# Patient Record
Sex: FEMALE | Race: WHITE | Employment: FULL TIME
[De-identification: names, ages, dates, MRNs, and addresses within clinical notes are randomized per-mention and may not be internally consistent; named-entity substitution may affect disease eponyms.]

---

## 2018-09-02 ENCOUNTER — HEALTH MAINTENANCE LETTER (OUTPATIENT)
Age: 28
End: 2018-09-02

## 2019-11-03 ENCOUNTER — HEALTH MAINTENANCE LETTER (OUTPATIENT)
Age: 29
End: 2019-11-03

## 2020-04-20 LAB
ABO + RH BLD: NORMAL
ABO + RH BLD: NORMAL
BLD GP AB SCN SERPL QL: NEGATIVE
C TRACH DNA SPEC QL PROBE+SIG AMP: NEGATIVE
CULTURE MICRO: NEGATIVE
HBV SURFACE AG SERPL QL IA: NEGATIVE
HEMOGLOBIN: 12.9 G/DL (ref 11.7–15.7)
N GONORRHOEA DNA SPEC QL PROBE+SIG AMP: NEGATIVE
PLATELET # BLD AUTO: 307 10^9/L
RUBELLA ANTIBODY IGG QUANTITATIVE: NORMAL IU/ML

## 2020-04-24 ENCOUNTER — OFFICE VISIT (OUTPATIENT)
Dept: OBGYN | Facility: CLINIC | Age: 30
End: 2020-04-24
Attending: MIDWIFE
Payer: COMMERCIAL

## 2020-04-24 DIAGNOSIS — O09.899 SUPERVISION OF OTHER HIGH RISK PREGNANCY, ANTEPARTUM: Primary | ICD-10-CM

## 2020-04-24 NOTE — LETTER
Date:April 27, 2020      Patient was self referred, no letter generated. Do not send.        [facility] Health Information

## 2020-04-24 NOTE — LETTER
2020       RE: Leticia Ngo  [address]  [place] [address]     Dear Colleague,    Thank you for referring your patient, Leticia Ngo, to the [facility] at [facility]. Please see a copy of my visit note below.    Leticia is a 31 yo  at 11 wks   calling just to speak with midwife team considering TANYA  Has not decided  Not doing official intake visit.  Hx 14 mos old SVB with brief shoulder dystocia  apgars 8,9 no injury   Resolved with flipping from knees to and ? Post arm reduction  Pt is unclear.  Pt was following [facility] midwife team and [facility] due to PROM without labor at term  . Pt has had early visit with dating 7 wk US and NOB labs at [facility]  Had a visit last Monday and heard FHT  Pt is declining genetic testing    Reviewed recommendation for early GCT and A1C   Reviewed [facility], team OB/CNM  And [facility]  Pt will schedule a visit in 4-5 wks for TANYA appt with records.    Opal Covington CNM APRCODEY    Again, thank you for allowing me to participate in the care of your patient.      Sincerely,    EVETTE Dinero CNM

## 2020-04-24 NOTE — PROGRESS NOTES
Leticia is a 31 yo  at 11 wks   calling just to speak with midwife team considering TANYA  Has not decided  Not doing official intake visit.  Hx 14 mos old SVB with brief shoulder dystocia  apgars 8,9 no injury   Resolved with flipping from knees to and ? Post arm reduction  Pt is unclear.  Pt was following [facility] midwife team and was [facility] due to PROM without labor at term  . Pt has had early visit with dating 7 wk US and NOB labs at [facility]  Had a visit last Monday and heard FHT  Pt is declining genetic testing    Reviewed recommendation for early GCT and A1C   Reviewed [facility], team OB/CNM  And [facility]  Pt will schedule a visit in 4-5 wks for TANYA appt with records.    Opal Covington CNM APRN

## 2020-05-05 ENCOUNTER — PRENATAL OFFICE VISIT (OUTPATIENT)
Dept: NURSING | Facility: CLINIC | Age: 30
End: 2020-05-05
Payer: COMMERCIAL

## 2020-05-05 VITALS — BODY MASS INDEX: 22.58 KG/M2 | HEIGHT: 65 IN

## 2020-05-05 DIAGNOSIS — Z34.90 SUPERVISION OF NORMAL PREGNANCY: Primary | ICD-10-CM

## 2020-05-05 PROBLEM — K21.9 ACID REFLUX: Status: ACTIVE | Noted: 2018-11-30

## 2020-05-05 PROCEDURE — 99207 ZZC NO CHARGE NURSE ONLY: CPT

## 2020-05-05 SDOH — SOCIAL STABILITY: SOCIAL INSECURITY: WITHIN THE LAST YEAR, HAVE YOU BEEN HUMILIATED OR EMOTIONALLY ABUSED IN OTHER WAYS BY YOUR PARTNER OR EX-PARTNER?: NO

## 2020-05-05 SDOH — HEALTH STABILITY: MENTAL HEALTH
STRESS IS WHEN SOMEONE FEELS TENSE, NERVOUS, ANXIOUS, OR CAN'T SLEEP AT NIGHT BECAUSE THEIR MIND IS TROUBLED. HOW STRESSED ARE YOU?: NOT AT ALL

## 2020-05-05 SDOH — SOCIAL STABILITY: SOCIAL INSECURITY
WITHIN THE LAST YEAR, HAVE YOU BEEN KICKED, HIT, SLAPPED, OR OTHERWISE PHYSICALLY HURT BY YOUR PARTNER OR EX-PARTNER?: NO

## 2020-05-05 SDOH — SOCIAL STABILITY: SOCIAL INSECURITY: WITHIN THE LAST YEAR, HAVE YOU BEEN AFRAID OF YOUR PARTNER OR EX-PARTNER?: NO

## 2020-05-05 SDOH — SOCIAL STABILITY: SOCIAL INSECURITY
WITHIN THE LAST YEAR, HAVE TO BEEN RAPED OR FORCED TO HAVE ANY KIND OF SEXUAL ACTIVITY BY YOUR PARTNER OR EX-PARTNER?: NO

## 2020-05-05 NOTE — PROGRESS NOTES
Important Information for Provider:     Patient is a patient at [facility] with the CNM group, she had TANYA last pregnancy. Patient is coming in for meeting our CNM group and has appointment with Christina 5/07/2020. Patient has not decided on a CNM group. New ob intake/ NOB labs were done at [facility]. Updated history.      Prenatal OB Questionnaire  Patient supplied answers from flow sheet for:  Prenatal OB Questionnaire.  Past Medical History  Diabetes?: No  Hypertension : No  Heart disease, mitral valve prolapse or rheumatic fever?: No  An autoimmune disease such as lupus or rheumatoid arthritis?: No  Kidney disease or urinary tract infection?: No  Epilepsy, seizures or spells?: No  Migraine headaches?: No  A stroke or loss of function or sensation?: No  Any other neurological problems?: No  Have you ever been treated for depression?: No  Are you having problems with crying spells or loss of self-esteem?: No  Have you ever required psychiatric care?: No  Have you ever had hepatitis, liver disease or jaundice?: No  Have you been treated for blood clots in your veins, deep vein thromosis, inflammation in the veins, thrombosis, phlebitis, pulmonary embolism or varicosities?: No  Have you had excessive bleeding after surgery or dental work?: No  Do you bleed more than other women after a cut or scratch?: No  Do you have a history of anemia?: No  Have you ever had thyroid problems or taken thyroid medication?: (!) Yes abnormal in 2006   Do you have any endocrine problems?: No  Have you ever been in a major accident or suffered serious trauma?: No  Within the last year, has anyone hit, slapped, kicked or otherwise hurt you?: No  In the last year, has anyone forced you to have sex when you didn't want to?: No    Past Medical History 2   Have you ever received a blood transfusion?: No  Would you refuse a blood transfusion if a doctor judged it to be medically necessary?: No   If you answered Yes, would you rather  die than receive a blood transfusion?: No  If you answered Yes, is this for Jewish reasons?: No  Does anyone in your home smoke?: No  Do you use tobacco products?: No  Do you drink beer, wine or hard liquor?: No  Do you use any of the following: marijuana, speed, cocaine, heroin, hallucinogens or other drugs?: No   Is your blood type Rh negative?: No  Have you ever had abnormal antibodies in your blood?: No  Have you ever had asthma?:   Have you ever had tuberculosis?: No  Do you have any allergies to drugs or over-the-counter medications?: (!) Yes  Allergies: Dust Mites, Aspartame, Ethanol, Venlafaxine, Hydrochloride, Sertraline: (!) Yes  Have you had any breast problems?:   Have you ever ?: (!) Yes  Have you had any gynecological surgical procedures such as cervical conization, a LEEP procedure, laser treatment, cryosurgery of the cervix or a dilation and curettage, etc?: No  Have you ever had any other surgical procedures?: (!) Yes appendectomy, oral   Have you been hospitalized for a nonsurgical reason excluding normal delivery?: No  Have you ever had any anesthetic complications?: No  Have you ever had an abnormal pap smear?: No    Past Medical History (Continued)  Do you have a history of abnormalities of the uterus?: No  Did your mother take LIBERTY or any other hormones when she was pregnant with you?: No  Did it take you more than a year to become pregnant?: No  Have you ever been evaluated or treated for infertility?: No  Is there a history of medical problems in your family, which you feel may be important to this pregnancy?: No  Do you have any other problems we have not asked about which you feel may be important to this pregnancy?: No    Symptoms since last menstrual period  Do you have any of the following symptoms: abdominal pain, blood in stools or urine, chest pain, shortness of breath, coughing or vomiting up blood, your heart racing or skipping beats, nausea and vomiting, pain on  urination or vaginal discharge or bleed: No  Will the patient be 35 years old or older at the time of delivery?: No    Has the patient, baby's father or anyone in either family had:  Thalassemia (Italian, Greek, Mediterranean or  background only) and an MCV result less than 80?: No  Neural tube defect such as meningomyelocele, spina bifida or anencephaly?: No  Congenital heart defect?: No  Down's Syndrome?: No  Peter-Sachs disease (Confucianist, Cajun, Faroese-Marshallese)?: No  Sickle cell disease or trait ()?: No  Hemophilia or other inherited problems of blood?: No  Muscular dystrophy?: No  Cystic fibrosis?: No  Pauline's chorea?: No  Mental retardation/autism?: No  If yes, was the person tested for fragile X?: No  Any other inherited genetic or chromosomal disorder?: No  Maternal metabolic disorder (e.g Insulin-dependent diabetes, PKU)?: No  A child with birth defects not listed above?: No  Recurrent pregnancy loss or stillbirth?: No   Has the patient had any medications/street drugs/alcohol since her last menstrual period?: No  Does the patient or baby's father have any other genetic risks?: No    Infection History   Do you object to being tested for Hepatitis B?: No  Do you object to being tested for HIV?: No   Do you feel that you are at high risk for coming in contact with the AIDS virus?: No  Have you ever been treated for tuberculosis?: No  Have you ever had a positive skin test for tuberculosis?: No  Do you live with someone who has tuberculosis?: No  Have you ever been exposed to tuberculosis?: No  Do you have genital herpes?: No  Does your partner have genital herpes?: No  Have you had a viral illness since your last period?: No  Have you ever had gonorrhea, chlamydia, syphilis, venereal warts, trichomoniasis, pelvic inflammatory disease or any other sexually transmitted disease?: No  Do you know if you are a genital group B streptococcus carrier?: No  Have you had chicken pox/varicella?: (!) Yes    Have you been vaccinated against chicken Pox?: No  Have you had any other infectious diseases?: No      Allergies as of 5/5/2020:    Allergies as of 05/05/2020 - Reviewed 06/30/2015   Allergen Reaction Noted     Ciprofloxacin Nausea and Vomiting 01/09/2014     Acetaminophen  01/24/2011     Hydrocodone  01/24/2011     Vicodin [acetaminophen] Nausea and Vomiting 04/19/2007       Current medications are:  No current outpatient medications on file.         Early ultrasound screening tool:    Does patient have irregular periods?  No  Did patient use hormonal birth control in the three months prior to positive urine pregnancy test? No  Is the patient breastfeeding?  No  Is the patient 10 weeks or greater at time of education visit?  Yes

## 2020-05-07 ENCOUNTER — PRENATAL OFFICE VISIT (OUTPATIENT)
Dept: MIDWIFE SERVICES | Facility: CLINIC | Age: 30
End: 2020-05-07
Payer: COMMERCIAL

## 2020-05-07 VITALS
SYSTOLIC BLOOD PRESSURE: 129 MMHG | DIASTOLIC BLOOD PRESSURE: 73 MMHG | BODY MASS INDEX: 24.13 KG/M2 | HEART RATE: 100 BPM | WEIGHT: 145 LBS | OXYGEN SATURATION: 97 %

## 2020-05-07 DIAGNOSIS — Z12.4 ROUTINE CERVICAL SMEAR: ICD-10-CM

## 2020-05-07 DIAGNOSIS — Z34.82 ENCOUNTER FOR SUPERVISION OF OTHER NORMAL PREGNANCY, SECOND TRIMESTER: Primary | ICD-10-CM

## 2020-05-07 DIAGNOSIS — Z34.80 SUPERVISION OF OTHER NORMAL PREGNANCY: ICD-10-CM

## 2020-05-07 DIAGNOSIS — Z23 NEED FOR TDAP VACCINATION: ICD-10-CM

## 2020-05-07 DIAGNOSIS — Z87.59 HISTORY OF SHOULDER DYSTOCIA IN PRIOR PREGNANCY: ICD-10-CM

## 2020-05-07 PROCEDURE — 87624 HPV HI-RISK TYP POOLED RSLT: CPT | Performed by: ADVANCED PRACTICE MIDWIFE

## 2020-05-07 PROCEDURE — G0145 SCR C/V CYTO,THINLAYER,RESCR: HCPCS | Performed by: ADVANCED PRACTICE MIDWIFE

## 2020-05-07 PROCEDURE — 99203 OFFICE O/P NEW LOW 30 MIN: CPT | Performed by: ADVANCED PRACTICE MIDWIFE

## 2020-05-07 RX ORDER — PRENATAL VIT/IRON FUM/FOLIC AC 27MG-0.8MG
1 TABLET ORAL DAILY
COMMUNITY

## 2020-05-07 RX ORDER — OMEGA-3-ACID ETHYL ESTERS 1 G/1
2 CAPSULE, LIQUID FILLED ORAL 2 TIMES DAILY
COMMUNITY

## 2020-05-07 RX ORDER — LACTOBACILLUS RHAMNOSUS GG 10B CELL
1 CAPSULE ORAL 2 TIMES DAILY
COMMUNITY
End: 2020-11-25

## 2020-05-07 NOTE — PROGRESS NOTES
13w0d   Leticia Ngo is a 30 year old who presents to the clinic for an new ob visit. She is not a previous CNM patient. She started her care at [facility] where she delivered her last baby. They stopped following their patients to the hospital and she needed to be induced last pregnancy due to PROM so wanted to switch care so she knew her providers at time of delivery. She also has a history of a shoulder dystocia. She reports she progressed very quickly from 4 to 10 cm. She was on hands and knees and they asked her to rotate to her back, did suprapubic pressure and Omero and shoulder dystocia resolved after about 110 sec. She had a good discussion on it with the [facility] midwives so has good details. No injury to her daughter Francia. Signed consent to get labs from [facility]. She is unsure if they hugh her thyroid, history of abnormal thyroid but has been awhile and normal since. Will look and if not can draw. Due for pap smear, collected today. Declined genetic testing. No other questions or concerns today.      Estimated Date of Delivery: Nov 12, 2020 is calculated from Patient's last menstrual period was 02/06/2020.     She has not had bleeding since her LMP.   She has had mild nausea. Weigh loss has not occurred.   This was a planned pregnancy.   BYRONB is involved, Miki   OTHER CONCERNS: no concerns     INFECTION HISTORY  HIV: no  Hepatitis B: no  Hepatitis C: no  Syphilis:  no  Tuberculosis: no   PPD- no  Herpes self: no  Herpes partner:  no  Chlamydia:  no  Gonorrhea:  no  HPV: no  BV:  no  Trichomonis:  no  Chicken Pox:  YES  ====================================================  GENETIC SCREENING  Genetic screening reviewed. High Risk? No  ====================================================  PERSONAL/SOCIAL HISTORY  Lives lives with their family.  Employment: Full time.  Her job involves moderate activity as .  HX OF ABUSE: no  =====================================================   REVIEW OF  SYSTEMS  CONSTITUTIONAL: NEGATIVE for fever, chills  EYES: NEGATIVE for vision changes   RESP: NEGATIVE for significant cough or SOB  CV: NEGATIVE for chest pain, palpitations   GI: NEGATIVE for nausea, abdominal pain, heartburn, or change in bowel habits  : NEGATIVE for frequency, dysuria, or hematuria  MUSCULOSKELETAL: NEGATIVE for significant arthralgias or myalgia  NEURO: NEGATIVE for weakness, dizziness or paresthesias or headache  ====================================================    PHYSICAL EXAM:  /73   Pulse 100   Wt 65.8 kg (145 lb)   LMP 02/06/2020   SpO2 97%   BMI 24.13 kg/m    BMI- Body mass index is 24.13 kg/m .,     RECOMMENDED WEIGHT GAIN: 15-25 lbs.  PHQ9- Today's Depression Rating was No Value exists for the : HP#PHQ9  GENERAL:  Pleasant pregnant female, alert, well groomed.   SKIN:  Warm and dry, without lesions or rashes  HEAD: Symmetrical features.  EYES:  PERRLA,   MOUTH:  Buccal mucosa pink, moist without lesions.    NECK:  Thyroid without enlargement and nodules.  Lymph nodes not palpable.   LUNGS:  Clear to auscultation.  HEART:  RRR without murmur.  ABDOMEN: Soft without masses , tenderness or organomegaly.  No CVA tenderness. No scars noted.     MUSCULOSKELETAL:  Full range of motion  EXTREMITIES:  No edema. No significant varicosities.     GENITALIA:  BUS WNL, no lesions noted   VAGINA:  Pink, normal rugae and discharge normal and physiologic  CERVIX:  smooth, without discharge or CMT and parous os,   firm/ closed 3 cm long.  UTERUS: Midposition, nontender 14 weeks in size.  ADNEXA: Unable to assess  PELVIS:   Adequate, Pelvis proven to 8 pounds 0 ounces.  RECTAL:  Normal appearance.  Digital exam deferred.    Gonorrhea/Chlamydia: negative  =========================================  ASSESSMENT:  13w0d  (Z34.82) Encounter for supervision of other normal pregnancy, second trimester  (primary encounter diagnosis)    (Z12.4) Routine cervical smear  Plan: Pap imaged  thin layer screen with HPV -         recommended age 30 - 65 years (select HPV order        below), HPV High Risk Types DNA Cervical    PREGNANCY AT RISK? no  ==========================================  PLAN:  Instructed on use of triage nurse line and contacting the on call CNM after hours for an urgent need such as fever, vagina bleeding, bladder or vaginal infection, rupture of membranes,  or term labor.    Discussed the indications, uses for and false positives for quad screen, nuchal translucency and fetal survey ultrasound at 18-20 weeks gestation. Declined today.   Instructed on best evidence for: weight gain for her BMI for pregnancy; healthy diet and foods to avoid; exercise and activity during pregnancy;avoiding exposure to toxoplasmosis; and maintenance of a generally healthy lifestyle.   Discussed the harms, benefits, side effects and alternative therapies for current prescribed and OTC medications.  Follow up in 3-4 weeks for fetal heart check.     EVETTE Munoz CNM

## 2020-05-07 NOTE — LETTER
May 15, 2020    Leticia MARR Huber  [address] [facility] [address]    Dear ,  This letter is regarding your recent Pap smear (cervical cancer screening) and Human Papillomavirus (HPV) test.  We are happy to inform you that your Pap smear result is normal. Cervical cancer is closely linked with certain types of HPV. Your results showed no evidence of high-risk HPV.  We recommend you have your next PAP smear and HPV test in 5 years.  You will still need to return to the clinic every year for an annual exam and other preventive tests.  If you have additional questions regarding this result, please call our registered nurse, Flora at [phone].  Sincerely,    EVETTE Munoz CNM/adam

## 2020-05-11 LAB
COPATH REPORT: NORMAL
PAP: NORMAL

## 2020-05-15 LAB
FINAL DIAGNOSIS: NORMAL
HPV HR 12 DNA CVX QL NAA+PROBE: NEGATIVE
HPV16 DNA SPEC QL NAA+PROBE: NEGATIVE
HPV18 DNA SPEC QL NAA+PROBE: NEGATIVE
SPECIMEN DESCRIPTION: NORMAL
SPECIMEN SOURCE CVX/VAG CYTO: NORMAL

## 2020-05-16 ENCOUNTER — TELEPHONE (OUTPATIENT)
Dept: OBGYN | Facility: CLINIC | Age: 30
End: 2020-05-16

## 2020-05-16 DIAGNOSIS — O23.41 URINARY TRACT INFECTION IN MOTHER DURING FIRST TRIMESTER OF PREGNANCY: Primary | ICD-10-CM

## 2020-05-16 RX ORDER — CEPHALEXIN 500 MG/1
500 CAPSULE ORAL 2 TIMES DAILY
Qty: 10 CAPSULE | Refills: 0 | Status: SHIPPED | OUTPATIENT
Start: 2020-05-16 | End: 2020-06-01

## 2020-05-16 NOTE — TELEPHONE ENCOUNTER
30 year old  at 14w2d thinks she may be getting a UTI.      Has urinary frequency, hesitancy, and pressure when bladder full. Doesn't know if she's emptying her bladder completely. Having some discomfort with urination but not as bad as her previous UTI but feels like it's getting a little worse.  Very concerned about possibility of kidney infection in pregnancy.      Reviewed presumptive treatment with Keflex and leave urine culture early next week when lab open - stop ABX if no s/s of UTI, finish ABX if appropriate or switch per culture.  Or have urine culture done and wait ABX. Pt would prefer to start presumptive treatment and then leave urine culture.    Has ANNA on 2020 but scheduled with S MD.  Would prefer CNM care only so switched to CN schedule - aware that d/t COVID based schedule changes there may be a change in the actually midwife she sees that day.  Had one visit at [facility] but prefers [facility] team so is reestablishing on 3rd floor.    Reinforced how/why to contact CNM prn if symptoms worsen.     Marine Cannon, EVETTE CNM

## 2020-05-19 DIAGNOSIS — O23.41 URINARY TRACT INFECTION IN MOTHER DURING FIRST TRIMESTER OF PREGNANCY: ICD-10-CM

## 2020-05-19 PROCEDURE — 87086 URINE CULTURE/COLONY COUNT: CPT | Performed by: ADVANCED PRACTICE MIDWIFE

## 2020-05-20 LAB
BACTERIA SPEC CULT: NO GROWTH
Lab: NORMAL
SPECIMEN SOURCE: NORMAL

## 2020-05-21 ENCOUNTER — TELEPHONE (OUTPATIENT)
Dept: OBGYN | Facility: CLINIC | Age: 30
End: 2020-05-21

## 2020-05-21 ENCOUNTER — TRANSCRIBE ORDERS (OUTPATIENT)
Dept: MATERNAL FETAL MEDICINE | Facility: CLINIC | Age: 30
End: 2020-05-21

## 2020-05-21 DIAGNOSIS — O26.90 PREGNANCY RELATED CONDITION, ANTEPARTUM: Primary | ICD-10-CM

## 2020-05-21 DIAGNOSIS — O09.899 SUPERVISION OF OTHER HIGH RISK PREGNANCY, ANTEPARTUM: Primary | ICD-10-CM

## 2020-05-21 NOTE — TELEPHONE ENCOUNTER
Patient called for result of urine culture. She was started on keflex 5/16 for presumptive UTI. Urine culture no growth.     Called to patient to discuss result. Voicemail left. Asked to call back to discuss how she is feeling.

## 2020-05-21 NOTE — TELEPHONE ENCOUNTER
- Pt is a 30 year old  at 15w0d who left urine sample earlier this week to follow up on possible UTI.  Called pt to follow up on urine culture results.  Noted no growth. Pt had been taking presumptive treatment for Keflex so if she did have an infection it was the appropriate treatment.  She is almost done the ABX so she will stop.  Reviewed common discomforts of pregnancy vs urinary symptoms and how/why to follow up with CNM team prn.  Pt is agreeable to consider q trimester urine cultures prn.   Pt is not yet feeling fetal movmenet - heard you can sometimes feel movement earlier once you've been pregnant before. States she had an anterior placenta before so perhaps it could be that again. Pt is not scheduled until  for appt in office, offered for pt to come into office for earlier appt for FHTs is desires, pt declines but will consider prn.    Pt was also willing to discuss a patient complaint email that was forwarded to this writer regarding her experience leaving her urine culture sample at the lab.  Urine culture order was placed by this writer for lab collection given that the triage occurred over the weekend and clinic was closed.  When labs are needed not at same time as appointment our office routinely uses a lab only appt at the outpatient lab.  Pt stated she got good directions from the nursing staff but when she got to right near where she thought the lab was she was told by the  information desk staff that she was in the wrong building and so she wandered a bit until she came back and found the lab on her own.   She stated that she has had good experience and everyone was overall helpful but it was frustrating that the information desk staff didn't seem to know where the lab was when it was just down the ferrer.  States she sent in the message to be sure that our office could work to see if samples for pregnant patients could be left in the lab even without an appointment.  Updated pt that this  "writer will look into whether lab slips for labs to be done in lab could be \"clinic collect\" and pts could come or if they must need to continue to be lab appointments.  Pt thanked writer for addressing concern. All pt's questions discussed and answered.  EVETTE Mc CNM    "

## 2020-05-21 NOTE — TELEPHONE ENCOUNTER
----- Message from Savana Taveras sent at 2020  1:58 PM CDT -----  Regardin wk US  Contact: [phone]  The pt needs to schedule her 20 wk US. Please call to discuss at [phone]    Thanks - Savana    Please DO NOT send this message and/or reply back to sender.  Call Center Representatives DO NOT respond to messages.

## 2020-05-21 NOTE — TELEPHONE ENCOUNTER
Orders for level 2 placed in [facility]  Phone number given. osmani            ----- Message from Savana Taveras sent at 2020  1:58 PM CDT -----  Regardin wk US  Contact: [phone]  The pt needs to schedule her 20 wk US. Please call to discuss at [phone]    Thanks - Savana    Please DO NOT send this message and/or reply back to sender.  Call Center Representatives DO NOT respond to messages.

## 2020-06-01 ENCOUNTER — OFFICE VISIT (OUTPATIENT)
Dept: OBGYN | Facility: CLINIC | Age: 30
End: 2020-06-01
Attending: NURSE PRACTITIONER
Payer: COMMERCIAL

## 2020-06-01 VITALS
SYSTOLIC BLOOD PRESSURE: 113 MMHG | HEART RATE: 94 BPM | DIASTOLIC BLOOD PRESSURE: 76 MMHG | HEIGHT: 65 IN | WEIGHT: 148.3 LBS | BODY MASS INDEX: 24.71 KG/M2

## 2020-06-01 DIAGNOSIS — Z34.92 PREGNANCY WITH PRENATAL CARE ELSEWHERE IN SECOND TRIMESTER: ICD-10-CM

## 2020-06-01 DIAGNOSIS — R39.15 URINARY URGENCY: ICD-10-CM

## 2020-06-01 DIAGNOSIS — Z34.80 SUPERVISION OF OTHER NORMAL PREGNANCY: Primary | ICD-10-CM

## 2020-06-01 DIAGNOSIS — Z87.59 HISTORY OF SHOULDER DYSTOCIA IN PRIOR PREGNANCY: ICD-10-CM

## 2020-06-01 PROBLEM — Z23 NEED FOR TDAP VACCINATION: Status: RESOLVED | Noted: 2020-05-07 | Resolved: 2020-06-01

## 2020-06-01 PROBLEM — Z34.90 PREGNANCY WITH PRENATAL CARE ELSEWHERE: Status: ACTIVE | Noted: 2020-06-01

## 2020-06-01 PROBLEM — O23.41 URINARY TRACT INFECTION IN MOTHER DURING FIRST TRIMESTER OF PREGNANCY: Status: RESOLVED | Noted: 2020-05-16 | Resolved: 2020-06-01

## 2020-06-01 LAB — GLUCOSE 1H P 50 G GLC PO SERPL-MCNC: 124 MG/DL (ref 60–129)

## 2020-06-01 PROCEDURE — 36415 COLL VENOUS BLD VENIPUNCTURE: CPT | Performed by: ADVANCED PRACTICE MIDWIFE

## 2020-06-01 PROCEDURE — 82950 GLUCOSE TEST: CPT | Performed by: ADVANCED PRACTICE MIDWIFE

## 2020-06-01 PROCEDURE — G0463 HOSPITAL OUTPT CLINIC VISIT: HCPCS | Mod: ZF

## 2020-06-01 ASSESSMENT — MIFFLIN-ST. JEOR: SCORE: 1393.56

## 2020-06-01 NOTE — PROGRESS NOTES
Transfer of Care  SUBJECTIVE  30 year old woman presents to clinic for transfer of OB care appointment.    Patient's last menstrual period was 2020.  at 16w4d by Estimated Date of Delivery: 2020 based on LMP.     - Feels well overall. Slight fetal movment, occasionally. Denies cramping, contractions. Denies leaking of fluid, abnormal discharge, or leaking of fluid.   - Pt was receiving prenatal care from [facility].  Awaiting prenatal records, not yet received    -Level II Ultsound scheduled for 20    OTHER CONCERNS:    - Family hx of DM. Early GCT completed today   - Pt reports urge to urinate frequently, but uses restroom with little relief. Has had UC completed, which was negative. Had issues with incontinence after birth of her daughter, used physical therapy with good improvement in symptoms. Would be interested in restarting during pregnancy.   - Pt also reports lower back pain and occ RLP   - Questions re: family trip and safety d/t COVID19    - Current Medications   Current Outpatient Medications   Medication Sig Dispense Refill     lactobacillus rhamnosus, GG, (CULTURELL) capsule Take 1 capsule by mouth 2 times daily       omega-3 acid ethyl esters (LOVAZA) 1 g capsule Take 2 g by mouth 2 times daily       Prenatal Vit-Fe Fumarate-FA (PRENATAL MULTIVITAMIN W/IRON) 27-0.8 MG tablet Take 1 tablet by mouth daily           - Co-morbids    Past Medical History:   Diagnosis Date     Allergic rhinitis, cause unspecified      - Risk for GDM -  has First degree relative with GDM or DM  WILL have an early GCT    - High Risk for Pre E-  Has No known risk factors of High risk for Pre E so WILL NOT start low dose aspirin (81mg) starting between 12 and 28 weeks to prevent early onset preeclampsia.    - Moderate risk - has moderate risk factors for Pre E including No moderate risk factors   Since she meets none of the moderate risk facrtors for Pre E -   so WILL NOT consider starting low  dose aspirin (81mg) starting between 12 and 28 weeks to prevent early onset preeclampsia.    - The patient  does not have a history of spontaneous  birth so  WILL NOT consider progesterone starting at 16-20 weeks and/or serial transvaginal cervical length ultrasounds from 16-24 weeks.     -The patient does not have a history of immunosuppresion or HIV so Toxoplasma IgG/IgM WILL NOT be ordered.    PERSONAL/SOCIAL HISTORY  Partner is involved,  Miki.  .  Employment: Full time,  at [employer] . Her job involves light activity .  History of anxiety or depression: denies  Additional items: Denies past or present domestic violence, sexual and psychological abuse.    PSYCHIATRIC:  Denies mood changes.   PHQ-9 score:  No flowsheet data found.  No flowsheet data found.    Past History:  Her past medical history   Past Medical History:   Diagnosis Date     Allergic rhinitis, cause unspecified      She has a history of  shoulder dystocia.  Since her last LMP she denies use of alcohol, tobacco and street drugs.  HISTORY:  Family History   Problem Relation Age of Onset     Other - See Comments Mother         ovarian cysts     Skin Cancer Mother         nose, removed      Lipids Father      Diabetes Father      Mental Illness Father      Personality Disorder Father         borderline personality disorder      Cancer - colorectal Maternal Grandfather      Neurologic Disorder Maternal Grandfather         parkinson     Diabetes Paternal Grandmother      Cancer - colorectal Paternal Grandfather      Diabetes Paternal Grandfather      C.A.D. Paternal Uncle         MI, by pass     C.A.SYDNEY. Paternal Uncle         MI      Socioeconomic History     Marital status:      Spouse name: Miki     Number of children: 1     Years of education: None     Highest education level: None   Occupational History     Comment: , [employer]   Social Needs     Financial resource strain: None     Food insecurity      Worry: None     Inability: None     Transportation needs     Medical: None     Non-medical: None   Tobacco Use     Smoking status: Never Smoker     Smokeless tobacco: Never Used   Substance and Sexual Activity     Alcohol use: Not Currently     Comment: 5 drinks per week, but doesn't drink every week     Drug use: No     Sexual activity: Yes     Partners: Male   Lifestyle     Physical activity     Days per week: None     Minutes per session: None     Stress: Not at all   Relationships     Social connections     Talks on phone: None     Gets together: None     Attends Nondenominational service: None     Active member of club or organization: None     Attends meetings of clubs or organizations: None     Relationship status: None     Intimate partner violence     Fear of current or ex partner: No     Emotionally abused: No     Physically abused: No     Forced sexual activity: No   Other Topics Concern     Parent/sibling w/ CABG, MI or angioplasty before 65F 55M? No   Social History Narrative     None     Current Outpatient Medications   Medication Sig     lactobacillus rhamnosus, GG, (CULTURELL) capsule Take 1 capsule by mouth 2 times daily     omega-3 acid ethyl esters (LOVAZA) 1 g capsule Take 2 g by mouth 2 times daily     Prenatal Vit-Fe Fumarate-FA (PRENATAL MULTIVITAMIN W/IRON) 27-0.8 MG tablet Take 1 tablet by mouth daily     No current facility-administered medications for this visit.      Allergies   Allergen Reactions     Ciprofloxacin Nausea and Vomiting     Hydrocodone Nausea and Vomiting     Vicodin [Acetaminophen] Nausea and Vomiting     ============================================  MEDICAL HISTORY  Past Medical History:   Diagnosis Date     Allergic rhinitis, cause unspecified      Past Surgical History:   Procedure Laterality Date     appendicitis  2019     C ORAL SURGERY PROCEDURE      Grant teeth     OB History    Para Term  AB Living   3 1 1 0 1 1   SAB TAB Ectopic Multiple Live Births   0  "0 0 0 1      # Outcome Date GA Lbr Elder/2nd Weight Sex Delivery Anes PTL Lv   3 Current            2 AB 2019 7w0d    SAB      1 Term 19 38w4d  3.629 kg (8 lb) F  Nitrous N KATIE      Birth Comments: shoulder dystocia for about 110 sec, resolved with brad and suprapubic pressure       Complications: Shoulder Dystocia      Name: Francia       GYN History- Denies Abnormal Pap Smears; Last pap  NILM, HPV neg                        Cervical procedures: none                        History of STI: none    I personally reviewed the past social/family/medical and surgical history on the date of service.     ROS: 10 point ROS neg other than the symptoms noted above in the HPI.    Objective  /76 (BP Location: Right arm, Patient Position: Chair)   Pulse 94   Ht 1.651 m (5' 5\")   Wt 67.3 kg (148 lb 4.8 oz)   LMP 2020   BMI 24.68 kg/m     EXAM:  GENERAL:  Pleasant pregnant female, alert, cooperative and well groomed.  SKIN:  Warm and dry, without lesions or rashes  HEAD: Symmetrical features.  MOUTH:  Buccal mucosa pink, moist without lesions.  Teeth in good repair.    ABDOMEN: Soft without masses , tenderness or organomegaly.  No CVA tenderness.  Uterus palpable at size equal to dates.  No scars noted.. Fetal heart tones present.  MUSCULOSKELETAL:  Full range of motion  EXTREMITIES:  No edema. No significant varicosities.     Lab Results   Component Value Date    PAP NIL 2020      Assessment/Plan  30 year old , 16w4d weeks of pregnancy with DARYA of 2020 by LMP  Genetic Screening: Level 2 Ultrasound only    Orders Placed This Encounter   Procedures     Glucose 1 Hour     [facility] PT, HAND, AND CHIROPRACTIC REFERRAL     - Oriented to Practice, types of care, and how to reach a provider.  Pt prefers CNM team  - Patient desires level II ultrasound, which is scheduled.  - Educational handout on the prevention of infections diseases during pregnancy provided.  - Reviewed healthy diet " and foods to avoid, exercise and activity during pregnancy; avoiding exposure to toxoplasmosis; and maintenance of a generally healthy lifestyle.   - Discussed the harms, benefits, side effects and alternative therapies for current prescribed and OTC medications. Patient was encouraged to start prenatal vitamins as tolerated.    - Reviewed use of triage nurse line and contacting the on-call provider after hours for an urgent need such as fever, vagina bleeding, bladder or vaginal infection, rupture of membranes,  or term labor.      - Reinforced COVID-19 precautions including: social distancing of at least 6 feet, avoiding gatherings of 10 persons or more, frequent hand hygiene, avoiding discretionary travel, avoiding touching of face, and cleaning and disinfecting frequently touched surfaces daily.  Encouraged household members to follow the same guidelines.    - If suspected exposure to COVID-19 or onset of fever and symptoms, such as cough or difficulty breathing visit [email or web address] promptly to be directed to the appropriate care and, if pregnant, call [phone] to notify your healthcare team.       - Reviewed cohort scheduling of ObGyns, CNMS, and residents which may cause a change in provider at future scheduled clinic appointments.    - Reinforced current hospital policy restricting visitors to 1 healthy adult (age >18)  for the entire duration of stay on labor and delivery and postpartum, and one healthy adult at a time in the NICU.  At present, doulas are NOT excluded from this restriction.  Also, reinforced clinic visitor policy restricting any visitors from attending office visits to limit the spread of COVID-19.      - Patient was sent to lab for routine OB labs including early GCT.   - Reviewed high risk for gestational diabetes d/t First degree relative with GDM or DM , IS agreeable to early 1 hour today.  - All pt's questions discussed and answered.  Pt verbalized understanding of and  agreement to plan of care.   - Referral given for pelvic floor physical therapy.  - Encouraged warm soaks, heating pad, stretches, maternity belt, chiropractor for relief of back pain. Consider PT if worsens in pregnancy.  - ANGELIA completed and sent for prenatal records  - Would prefer to be seen in person for next visit, until she can feel fetal movement more.  - Continue scheduled prenatal care and prn if questions or concerns, RTC in 4 weeks    EVETTE William CNM

## 2020-06-03 ENCOUNTER — TRANSFERRED RECORDS (OUTPATIENT)
Dept: HEALTH INFORMATION MANAGEMENT | Facility: CLINIC | Age: 30
End: 2020-06-03

## 2020-06-09 ENCOUNTER — PRE VISIT (OUTPATIENT)
Dept: MATERNAL FETAL MEDICINE | Facility: CLINIC | Age: 30
End: 2020-06-09

## 2020-06-11 ENCOUNTER — HOSPITAL ENCOUNTER (OUTPATIENT)
Dept: ULTRASOUND IMAGING | Facility: CLINIC | Age: 30
End: 2020-06-11
Attending: ADVANCED PRACTICE MIDWIFE
Payer: COMMERCIAL

## 2020-06-11 ENCOUNTER — OFFICE VISIT (OUTPATIENT)
Dept: MATERNAL FETAL MEDICINE | Facility: CLINIC | Age: 30
End: 2020-06-11
Attending: ADVANCED PRACTICE MIDWIFE
Payer: COMMERCIAL

## 2020-06-11 DIAGNOSIS — O09.292 HISTORY OF SHOULDER DYSTOCIA IN PRIOR PREGNANCY, CURRENTLY PREGNANT IN SECOND TRIMESTER: Primary | ICD-10-CM

## 2020-06-11 DIAGNOSIS — O26.90 PREGNANCY RELATED CONDITION, ANTEPARTUM: ICD-10-CM

## 2020-06-11 PROCEDURE — 76811 OB US DETAILED SNGL FETUS: CPT

## 2020-06-11 NOTE — PROGRESS NOTES
History of shoulder dystocia.     We discussed with Ms. Cid that shoulder dystocia is an unpredictable and unpreventable obstetric emergency that occur at the time of delivery in which the fetal shoulder descent gets obstructed and precludes delivery of the infant. Shoulder dystocia is associated with significant maternal and  complications, although severe complications are often rare. Here case appears to be a mild shoulder dystocia that resolved with routine maneuvers    We discussed that the risk of recurrence is estimated to be about 3-15%. Gestational and pre gestational diabetes, fetal macrosomia are associated with an increase in the risk of shoulder dystocia. The biggest factor appears to be related to a history of prior shoulder dystocia. We explained that the prediction of shoulder dystocia is difficult and to date the data do not support any specific fetal evaluation to predict the risk of shoulder dystocia.     We discuss that  delivery can be considered for the prevention of shoulder dystocia after counseling of risks and benefits and ultimately is a decision to be made by the obstetric care provider and the patient. An elective  delivery is recommended if the estimated fetal weight is > 5000 grams in a patient without diabetes and >4500 grams in patients with gestational diabetes. Early induction of labor at <39 weeks is not recommended as a mean to decrease the risk of shoulder dystocia.     We discussed with the patient that the consideration of a  delivery vs attempting a vaginal delivery is a discussion to have with you once the patient. Consideration to estimate the fetal weight in the late third trimester can help  the patient about options for delivery.

## 2020-06-11 NOTE — PROGRESS NOTES
Please see the imaging tab for details of the ultrasound performed today.    Ms. Ngo had questions regarding her history of shoulder dystocia    We discussed with Ms. Ngo that shoulder dystocia is an unpredictable and unpreventable obstetric emergency that occur at the time of delivery in which the fetal shoulder descent gets obstructed and precludes delivery of the infant. Shoulder dystocia is associated with significant maternal and  complications, although severe complications are often rare. Here case appears to be a mild shoulder dystocia that resolved with routine maneuvers    We discussed that the risk of recurrence is estimated to be about 3-15%. Gestational and pre gestational diabetes, fetal macrosomia are associated with an increase in the risk of shoulder dystocia. The biggest factor appears to be related to a history of prior shoulder dystocia. We explained that the prediction of shoulder dystocia is difficult and to date the data do not support any specific fetal evaluation to predict the risk of shoulder dystocia.     We discuss that  delivery can be considered for the prevention of shoulder dystocia after counseling of risks and benefits and ultimately is a decision to be made by the obstetric care provider and the patient. An elective  delivery is recommended if the estimated fetal weight is > 5000 grams in a patient without diabetes and >4500 grams in patients with gestational diabetes. Early induction of labor at <39 weeks is not recommended as a mean to decrease the risk of shoulder dystocia.     We discussed with the patient that the consideration of a  delivery vs attempting a vaginal delivery is a discussion to have with you once the patient. Consideration to estimate the fetal weight in the late third trimester can help  the patient about options for delivery.     I discussed this patient with Dr. Mitzy Santiago MD  [facility]  Fellow   6/11/2020  9:22 AM      Christina Forrester MD  Specialist in Maternal-Fetal Medicine

## 2020-06-29 ENCOUNTER — OFFICE VISIT (OUTPATIENT)
Dept: OBGYN | Facility: CLINIC | Age: 30
End: 2020-06-29
Attending: ADVANCED PRACTICE MIDWIFE
Payer: COMMERCIAL

## 2020-06-29 VITALS
SYSTOLIC BLOOD PRESSURE: 113 MMHG | HEIGHT: 65 IN | BODY MASS INDEX: 25.72 KG/M2 | DIASTOLIC BLOOD PRESSURE: 72 MMHG | WEIGHT: 154.4 LBS | HEART RATE: 97 BPM

## 2020-06-29 DIAGNOSIS — Z34.80 SUPERVISION OF OTHER NORMAL PREGNANCY: Primary | ICD-10-CM

## 2020-06-29 PROCEDURE — G0463 HOSPITAL OUTPT CLINIC VISIT: HCPCS | Mod: ZF

## 2020-06-29 ASSESSMENT — MIFFLIN-ST. JEOR: SCORE: 1421.23

## 2020-06-29 NOTE — LETTER
"2020       RE: Leticia Ngo  [address]  [place] [address]     Dear Colleague,    Thank you for referring your patient, Leticia Ngo, to the [facility] Please see a copy of my visit note below.    Subjective:      30 year old  at 20w4d presents for a routine prenatal appointment.         Denies cramping/contractions, vaginal bleeding, discharge or leakage of fluid. Reports +fetal movement.  No HA, vision changes, ruq/epigastric pain.       Patient concerns: Feeling well overall.  Has noticed some more fatigue, especially with exercise. Now carrying around her 26lb child. Declines maternity belt script.     Pt notes that she desires an unmedicated labor and birth. Interested in hydrotherapy. Will consider .  Last labor PROM, transferred to [facility] where she was induced with cervidil. States she was progressed from 1cm to 4 in 5 hours but then from 4 to 10cm in 1 hour.   Had 110 second shoulder dystocia relieved with brad and suprapubic. No injury to baby. Birth weight 8lbs.   Per MFM at  level 2 ultrasound, consider growth us at 36 weeks.    Has health form for insurance discount via work that she plans to send. Asking about wt gain.    Objective:  Vitals:    20 1035   BP: 113/72   BP Location: Right arm   Patient Position: Chair   Pulse: 97   Weight: 70 kg (154 lb 6.4 oz)   Height: 1.651 m (5' 5\")         See OB flowsheet    Assessment/Plan     Encounter Diagnosis   Name Primary?     Supervision of other normal pregnancy Yes       - Reviewed total weight gain, encouraged continued healthy diet and exercise.      - Reviewed why/how to contact provider.      Patient education/orders or handouts today:  PTL signs/symptoms, fetal movement and Plan for EOB visit w labs    - Reviewed level 2 ultrasound and MFM recommendations.  Pt desires vaginal delivery. Reviewed precautions at time of birth for " shoulder dystocia.  Will plan growth ultrasound at 36 weeks.   - Plan EOB at 28 weeks.     Continue scheduled prenatal care, RTC in 4 weeks ANNA, 8 weeks EOB and prn if questions or concerns.      EVETTE Majano, CNM

## 2020-06-29 NOTE — NURSING NOTE
Chief Complaint   Patient presents with     Prenatal Care     20w4d       See MARCI Vega 6/29/2020

## 2020-07-07 NOTE — PROGRESS NOTES
"Subjective:      30 year old  at 20w4d presents for a routine prenatal appointment.         Denies cramping/contractions, vaginal bleeding, discharge or leakage of fluid. Reports +fetal movement.  No HA, vision changes, ruq/epigastric pain.       Patient concerns: Feeling well overall.  Has noticed some more fatigue, especially with exercise. Now carrying around her 26lb child. Declines maternity belt script.     Pt notes that she desires an unmedicated labor and birth. Interested in hydrotherapy. Will consider .  Last labor PROM, transferred to [facility] where she was induced with cervidil. States she was progressed from 1cm to 4 in 5 hours but then from 4 to 10cm in 1 hour.   Had 110 second shoulder dystocia relieved with brad and suprapubic. No injury to baby. Birth weight 8lbs.   Per MFM at  level 2 ultrasound, consider growth us at 36 weeks.    Has health form for insurance discount via work that she plans to send. Asking about wt gain.    Objective:  Vitals:    20 1035   BP: 113/72   BP Location: Right arm   Patient Position: Chair   Pulse: 97   Weight: 70 kg (154 lb 6.4 oz)   Height: 1.651 m (5' 5\")         See OB flowsheet    Assessment/Plan     Encounter Diagnosis   Name Primary?     Supervision of other normal pregnancy Yes       - Reviewed total weight gain, encouraged continued healthy diet and exercise.      - Reviewed why/how to contact provider.      Patient education/orders or handouts today:  PTL signs/symptoms, fetal movement and Plan for EOB visit w labs    - Reviewed level 2 ultrasound and MFM recommendations.  Pt desires vaginal delivery. Reviewed precautions at time of birth for shoulder dystocia.  Will plan growth ultrasound at 36 weeks.   - Plan EOB at 28 weeks.     Continue scheduled prenatal care, RTC in 4 weeks ANNA, 8 weeks EOB and prn if questions or concerns.      Brigitte Long, APRN, CNM   "

## 2020-07-13 ENCOUNTER — TELEPHONE (OUTPATIENT)
Dept: OBGYN | Facility: CLINIC | Age: 30
End: 2020-07-13

## 2020-07-13 NOTE — TELEPHONE ENCOUNTER
Patient called today 22 weeks along was in a house of friend and contractor was tested positive for covid-  Leticia denies fever, chills, cough or rash-  Does have nasal congestion at night- could be pregnancy related has hd for a while and also some pregnancy shortness of breath- will go on on [email or web address] to be triaged for covid.  Gave precautions to self isolate.

## 2020-07-25 ENCOUNTER — TELEPHONE (OUTPATIENT)
Dept: OBGYN | Facility: CLINIC | Age: 30
End: 2020-07-25

## 2020-07-26 ENCOUNTER — TELEPHONE (OUTPATIENT)
Dept: FAMILY MEDICINE | Facility: CLINIC | Age: 30
End: 2020-07-26

## 2020-07-26 ENCOUNTER — TELEPHONE (OUTPATIENT)
Dept: OBGYN | Facility: CLINIC | Age: 30
End: 2020-07-26

## 2020-07-26 NOTE — TELEPHONE ENCOUNTER
Message Return  7/26/2020  1:32 PM    Message returned by Booker Edmonds DO,  For [facility]  Allie Kim    Patient: Leticia Ngo   Phone number-  [phone] (home)       Return their call  Phone conversation with: Patient    Situation: Leticia Ngo  Is a 30 year old  female who is calling because of  concerns about recent foot infection & medications.    Background :   -has a blister on back of R heel that pt reports is infected, has a small red line that is streaking  -also got stung by bee on the same foot  -was prescribed keflex - first dose on 7/26  -painful, itchy, foot is swollen-hard to walk  -took benadryl 50mg with minimal relief  -has questions regarding pain relief, antibiotics and baby's safety    Assessment/Plan:    1) infected foot blister vs cellulitis  -for pain relief-tylenol  -elevate foot to relieve swelling & use ice packs as needed  -continue benadryl prn for itching; ok to use benadryl cream as well  -continue keflex as prescribed  -counseled patient that increased redness can be expected during day 1-2 of antibiotics    Provided reassurance to the patient-discussed that this is a localized skin infection and baby is safe. Also discussed that these medications are safe to take together, and safe for baby.  Patient understands and is agreeable to plan.    Advised caller to follow up with PCP within 1 week. Go to urgent care if worsening swelling, redness, or pain.      Booker Edmonds DO   PGY-2  [facility] Medicine  Pager [phone]

## 2020-07-26 NOTE — TELEPHONE ENCOUNTER
I agree with the PFSH and ROS as completed by the resident, Booker Edmodns MD, except for changes made by me. The remainder of the encounter was performed by me and scribed by the student. The scribed note accurately reflects my personal services and decisions made by me.  Allie Borrego, SIMON, CNM, APRN

## 2020-07-26 NOTE — TELEPHONE ENCOUNTER
Patient calling about possible infected blister on back of foot.  States that it is painful, there may be a red streak starting to form.  Feels tired, does not endorse other symptoms.  Was stung by a bee on the same foot.  Encouraged patient to visit an urgent care for evaluation.  Patient hesitant due to Covid, she will check into a virtual visit with a primary care clinic.  Reviewed that if wound is infected, that it should be treated, but needs evaluation by primary care provider.  EVETTE KellyM

## 2020-07-28 ENCOUNTER — OFFICE VISIT (OUTPATIENT)
Dept: OBGYN | Facility: CLINIC | Age: 30
End: 2020-07-28
Attending: ADVANCED PRACTICE MIDWIFE
Payer: COMMERCIAL

## 2020-07-28 VITALS
BODY MASS INDEX: 27.09 KG/M2 | WEIGHT: 162.6 LBS | HEART RATE: 81 BPM | SYSTOLIC BLOOD PRESSURE: 128 MMHG | DIASTOLIC BLOOD PRESSURE: 81 MMHG | HEIGHT: 65 IN

## 2020-07-28 DIAGNOSIS — Z34.80 SUPERVISION OF OTHER NORMAL PREGNANCY: Primary | ICD-10-CM

## 2020-07-28 DIAGNOSIS — Z87.59 HISTORY OF SHOULDER DYSTOCIA IN PRIOR PREGNANCY: ICD-10-CM

## 2020-07-28 DIAGNOSIS — R94.6 NONSPECIFIC ABNORMAL RESULTS OF THYROID FUNCTION STUDY: ICD-10-CM

## 2020-07-28 PROCEDURE — G0463 HOSPITAL OUTPT CLINIC VISIT: HCPCS | Mod: ZF

## 2020-07-28 RX ORDER — CEPHALEXIN 500 MG/1
500 CAPSULE ORAL 2 TIMES DAILY
COMMUNITY
End: 2020-08-24

## 2020-07-28 RX ORDER — DIPHENHYDRAMINE HCL 25 MG
25 TABLET ORAL EVERY 6 HOURS PRN
COMMUNITY
End: 2020-10-16

## 2020-07-28 ASSESSMENT — PAIN SCALES - GENERAL: PAINLEVEL: NO PAIN (0)

## 2020-07-28 ASSESSMENT — MIFFLIN-ST. JEOR: SCORE: 1458.43

## 2020-07-28 NOTE — PROGRESS NOTES
"Subjective:      30 year old  at 24w5d presents for a routine prenatal appointment.       no vaginal bleeding or leakage of fluid.  no contractions or cramping.    pos fetal movement.       No HA, visual changes, RUQ or epigastric pain.   The patient presents with the following concerns: some constipation-drinking more water  Level II US  Results reviewed normal scan.        Objective:  Vitals:    20 0812   BP: 128/81   Pulse: 81   Weight: 73.8 kg (162 lb 9.6 oz)   Height: 1.651 m (5' 5\")     See OB flowsheet    Assessment/Plan     Encounter Diagnoses   Name Primary?     Supervision of other normal pregnancy Yes     Nonspecific abnormal results of thyroid function study      History of shoulder dystocia in prior pregnancy      No orders of the defined types were placed in this encounter.    Orders Placed This Encounter   Medications     cephALEXin (KEFLEX) 500 MG capsule     Sig: Take 500 mg by mouth 2 times daily     diphenhydrAMINE (BENADRYL) 25 MG tablet     Sig: Take 25 mg by mouth every 6 hours as needed for itching or allergies       - Reviewed total weight gain, encouraged continued healthy diet and exercise.      - Reviewed why/how to contact provider.    Patient education/orders or handouts today:  PTL signs/symptoms and Plan for EOB visit w labs   Return to clinic in 4 weeks and prn if questions or concerns.   EVETTE Schmid CNM                "

## 2020-07-28 NOTE — LETTER
"2020       RE: Leticia Ngo  [address]  [facility] [address]     Dear Colleague,    Thank you for referring your patient, Leticia Ngo, to the [facility] at [facility]. Please see a copy of my visit note below.    Subjective:      30 year old  at 24w5d presents for a routine prenatal appointment.       no vaginal bleeding or leakage of fluid.  no contractions or cramping.    pos fetal movement.       No HA, visual changes, RUQ or epigastric pain.   The patient presents with the following concerns: some constipation-drinking more water  Level II US  Results reviewed normal scan.        Objective:  Vitals:    20 0812   BP: 128/81   Pulse: 81   Weight: 73.8 kg (162 lb 9.6 oz)   Height: 1.651 m (5' 5\")     See OB flowsheet    Assessment/Plan     Encounter Diagnoses   Name Primary?     Supervision of other normal pregnancy Yes     Nonspecific abnormal results of thyroid function study      History of shoulder dystocia in prior pregnancy      No orders of the defined types were placed in this encounter.    Orders Placed This Encounter   Medications     cephALEXin (KEFLEX) 500 MG capsule     Sig: Take 500 mg by mouth 2 times daily     diphenhydrAMINE (BENADRYL) 25 MG tablet     Sig: Take 25 mg by mouth every 6 hours as needed for itching or allergies       - Reviewed total weight gain, encouraged continued healthy diet and exercise.      - Reviewed why/how to contact provider.    Patient education/orders or handouts today:  PTL signs/symptoms and Plan for EOB visit w labs   Return to clinic in 4 weeks and prn if questions or concerns.   Marva Patton, APRN SATNAM                    "

## 2020-08-19 ENCOUNTER — TELEPHONE (OUTPATIENT)
Dept: OBGYN | Facility: CLINIC | Age: 30
End: 2020-08-19
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
## 2020-08-24 ENCOUNTER — OFFICE VISIT (OUTPATIENT)
Dept: OBGYN | Facility: CLINIC | Age: 30
End: 2020-08-24
Attending: MIDWIFE
Payer: COMMERCIAL

## 2020-08-24 VITALS
HEIGHT: 65 IN | HEART RATE: 105 BPM | BODY MASS INDEX: 27.91 KG/M2 | DIASTOLIC BLOOD PRESSURE: 71 MMHG | WEIGHT: 167.5 LBS | SYSTOLIC BLOOD PRESSURE: 115 MMHG

## 2020-08-24 DIAGNOSIS — Z34.93 PREGNANCY WITH PRENATAL CARE ELSEWHERE IN THIRD TRIMESTER: ICD-10-CM

## 2020-08-24 DIAGNOSIS — Z87.59 HISTORY OF SHOULDER DYSTOCIA IN PRIOR PREGNANCY: ICD-10-CM

## 2020-08-24 DIAGNOSIS — Z34.80 SUPERVISION OF OTHER NORMAL PREGNANCY: Primary | ICD-10-CM

## 2020-08-24 LAB
DEPRECATED CALCIDIOL+CALCIFEROL SERPL-MC: 47 UG/L (ref 20–75)
ERYTHROCYTE [DISTWIDTH] IN BLOOD BY AUTOMATED COUNT: 12.2 % (ref 10–15)
GLUCOSE 1H P 50 G GLC PO SERPL-MCNC: 129 MG/DL (ref 60–129)
HBV SURFACE AB SERPL IA-ACNC: 544.57 M[IU]/ML
HCT VFR BLD AUTO: 37.3 % (ref 35–47)
HGB BLD-MCNC: 12.1 G/DL (ref 11.7–15.7)
MCH RBC QN AUTO: 30.7 PG (ref 26.5–33)
MCHC RBC AUTO-ENTMCNC: 32.4 G/DL (ref 31.5–36.5)
MCV RBC AUTO: 95 FL (ref 78–100)
PLATELET # BLD AUTO: 229 10E9/L (ref 150–450)
RBC # BLD AUTO: 3.94 10E12/L (ref 3.8–5.2)
T PALLIDUM AB SER QL: NONREACTIVE
WBC # BLD AUTO: 8.8 10E9/L (ref 4–11)

## 2020-08-24 PROCEDURE — 25000128 H RX IP 250 OP 636: Mod: ZF

## 2020-08-24 PROCEDURE — 82950 GLUCOSE TEST: CPT | Performed by: MIDWIFE

## 2020-08-24 PROCEDURE — 90715 TDAP VACCINE 7 YRS/> IM: CPT | Mod: ZF

## 2020-08-24 PROCEDURE — 86706 HEP B SURFACE ANTIBODY: CPT | Performed by: MIDWIFE

## 2020-08-24 PROCEDURE — 86780 TREPONEMA PALLIDUM: CPT | Performed by: MIDWIFE

## 2020-08-24 PROCEDURE — 82306 VITAMIN D 25 HYDROXY: CPT | Performed by: MIDWIFE

## 2020-08-24 PROCEDURE — 36415 COLL VENOUS BLD VENIPUNCTURE: CPT | Performed by: MIDWIFE

## 2020-08-24 PROCEDURE — 85027 COMPLETE CBC AUTOMATED: CPT | Performed by: MIDWIFE

## 2020-08-24 PROCEDURE — G0463 HOSPITAL OUTPT CLINIC VISIT: HCPCS | Mod: ZF

## 2020-08-24 PROCEDURE — 90471 IMMUNIZATION ADMIN: CPT | Mod: ZF

## 2020-08-24 ASSESSMENT — MIFFLIN-ST. JEOR: SCORE: 1480.66

## 2020-08-24 ASSESSMENT — PAIN SCALES - GENERAL: PAINLEVEL: NO PAIN (0)

## 2020-08-24 NOTE — LETTER
"2020       RE: Leticia Ngo  [address]  [facility] [address]     Dear Colleague,    Thank you for referring your patient, Leticia Ngo, to the [facility] at [facility]. Please see a copy of my visit note below.     30 year old, , 28w4d,     The patient presents with the following concerns:   - Feeling tired chasing 1.5 year old and being prenant- reviewed sleep positions, short rest periods  - Pelvic floor PT- stress incontinence occasionally, but has felt worse during pregnancy.   - Answered questions regarding  birth, pt just feels like she should know basic process in case it needed.   - Pt consents to Hep B antibody today, not done with intake labs    /71   Pulse 105   Ht 1.651 m (5' 5\")   Wt 76 kg (167 lb 8 oz)   LMP 2020   BMI 27.87 kg/m      Education completed today includes breast feeding, [facility] hand out , contraception, counting movements, signs of pre-term labor, when to present to birthplace, post partum depression, GBS and getting enough iron.  Birth preferences reviewed: Un-Medicated- did hypnobirthing with first baby.   Labor support:  - [place]     Feeding plans :    Contraception planned:  Mirena/hormonal IUD   Reviewed admission labs, IV access, and AMTSL. Pt prefers no IV, but is open to IM Pitocin.   Discussed unpredictability of shoulder dystocia, but higher risk with previous. Encouraged to maintain weight gain recommendations and follow-up with glucose testing as needed. Reiterated risks outlined in Dr. Brennan Santiago's 20 note.    The following labs were ordered today:       GCT, CBC w platelets, Vitamin D and Anti-treponema, Hep B antibody  Water birth consent form was not given. Pt mostly interested in bath tub hydrotherapy.   Blood type: A positive, no need for Rhogam.   TDAP  Was given.  A/P:    Encounter Diagnoses   Name Primary?     Supervision " of other normal pregnancy Yes     Pregnancy with prenatal care elsewhere in third trimester      History of shoulder dystocia in prior pregnancy      Orders Placed This Encounter   Procedures     Glucose tolerance gest screen 1 hour     CBC with platelets     Treponema Abs w Reflex to RPR and Titer     Vitamin D Deficiency     Hepatitis B Surface Antibody     - Needs work health form filled out. Discussed that lipids may be elevated due to pregnancy. Pt will call work program to see if this is needed. Pt aware it can be added as a future fasting test if desired.     Continue scheduled prenatal care in 3 weeks, prn   EVETTE Stein CNM

## 2020-08-24 NOTE — PROGRESS NOTES
" 30 year old, , 28w4d,     The patient presents with the following concerns:   - Feeling tired chasing 1.5 year old and being prenant- reviewed sleep positions, short rest periods  - Pelvic floor PT- stress incontinence occasionally, but has felt worse during pregnancy.   - Answered questions regarding  birth, pt just feels like she should know basic process in case it needed.   - Pt consents to Hep B antibody today, not done with intake labs    /71   Pulse 105   Ht 1.651 m (5' 5\")   Wt 76 kg (167 lb 8 oz)   LMP 2020   BMI 27.87 kg/m      Education completed today includes breast feeding, [facility] hand out , contraception, counting movements, signs of pre-term labor, when to present to birthplace, post partum depression, GBS and getting enough iron.  Birth preferences reviewed: Un-Medicated- did hypnobirthing with first baby.   Labor support:  - [place]     Feeding plans :    Contraception planned:  Mirena/hormonal IUD   Reviewed admission labs, IV access, and AMTSL. Pt prefers no IV, but is open to IM Pitocin.   Discussed unpredictability of shoulder dystocia, but higher risk with previous. Encouraged to maintain weight gain recommendations and follow-up with glucose testing as needed. Reiterated risks outlined in Dr. Brennan Santiago's 20 note.    The following labs were ordered today:       GCT, CBC w platelets, Vitamin D and Anti-treponema, Hep B antibody  Water birth consent form was not given. Pt mostly interested in bath tub hydrotherapy.   Blood type: A positive, no need for Rhogam.   TDAP  Was given.  A/P:    Encounter Diagnoses   Name Primary?     Supervision of other normal pregnancy Yes     Pregnancy with prenatal care elsewhere in third trimester      History of shoulder dystocia in prior pregnancy      Orders Placed This Encounter   Procedures     Glucose tolerance gest screen 1 hour     CBC with platelets     Treponema Abs w Reflex to RPR and " Titer     Vitamin D Deficiency     Hepatitis B Surface Antibody     - Needs work health form filled out. Discussed that lipids may be elevated due to pregnancy. Pt will call work program to see if this is needed. Pt aware it can be added as a future fasting test if desired.     Continue scheduled prenatal care in 3 weeks, EVETTE Hurley CNM

## 2020-09-17 ENCOUNTER — OFFICE VISIT (OUTPATIENT)
Dept: OBGYN | Facility: CLINIC | Age: 30
End: 2020-09-17
Attending: MIDWIFE
Payer: COMMERCIAL

## 2020-09-17 VITALS
SYSTOLIC BLOOD PRESSURE: 118 MMHG | DIASTOLIC BLOOD PRESSURE: 76 MMHG | HEART RATE: 88 BPM | HEIGHT: 65 IN | BODY MASS INDEX: 28.46 KG/M2 | WEIGHT: 170.8 LBS

## 2020-09-17 DIAGNOSIS — Z34.80 SUPERVISION OF OTHER NORMAL PREGNANCY: ICD-10-CM

## 2020-09-17 DIAGNOSIS — O09.93 HRP (HIGH RISK PREGNANCY), THIRD TRIMESTER: Primary | ICD-10-CM

## 2020-09-17 PROCEDURE — 90471 IMMUNIZATION ADMIN: CPT | Mod: ZF

## 2020-09-17 PROCEDURE — G0008 ADMIN INFLUENZA VIRUS VAC: HCPCS

## 2020-09-17 PROCEDURE — G0463 HOSPITAL OUTPT CLINIC VISIT: HCPCS | Mod: 25,ZF

## 2020-09-17 PROCEDURE — 90686 IIV4 VACC NO PRSV 0.5 ML IM: CPT | Mod: ZF

## 2020-09-17 PROCEDURE — 25000128 H RX IP 250 OP 636: Mod: ZF

## 2020-09-17 ASSESSMENT — MIFFLIN-ST. JEOR: SCORE: 1495.62

## 2020-09-17 NOTE — PROGRESS NOTES
"Subjective:      30 year old  at 32w0d presents for a routine prenatal appointment.         Denies vaginal bleeding,  leakage of fluid, or change in vaginal discharge.  Denies contractions.  Reports regular fetal movement.     No HA, visual changes, RUQ or epigastric pain.   Patient concerns: Worried about weight gain, trying to be mindful about carbohydrates and being active.  EOB labs reviewed from last visit.  Feeling well overall.   Objective:  Vitals:    20 0909   BP: 118/76   BP Location: Left arm   Patient Position: Chair   Pulse: 88   Weight: 77.5 kg (170 lb 12.8 oz)   Height: 1.651 m (5' 5\")    See OB flowsheet    Assessment/Plan     Encounter Diagnosis   Name Primary?     HRP (high risk pregnancy), third trimester Yes        Pre termLabor signs discussed. Reinforced daily fetal movement counts.  Reviewed why/how to contact provider if headache/visual changes/RUQ or epigastric pain, decreased fetal movement, vaginal bleeding, leakage of fluid.   Return to clinic in 2 week and prn if questions or concerns.     EVETTE Kelly CNM  "

## 2020-09-17 NOTE — LETTER
"2020       RE: Leticia Ngo  [address]  [facility] [address]     Dear Colleague,    Thank you for referring your patient, Leticia Ngo, to the [facility] at [facility]. Please see a copy of my visit note below.    Subjective:      30 year old  at 32w0d presents for a routine prenatal appointment.         Denies vaginal bleeding,  leakage of fluid, or change in vaginal discharge.  Denies contractions.  Reports regular fetal movement.     No HA, visual changes, RUQ or epigastric pain.   Patient concerns: Worried about weight gain, trying to be mindful about carbohydrates and being active.  EOB labs reviewed from last visit.  Feeling well overall.   Objective:  Vitals:    20 0909   BP: 118/76   BP Location: Left arm   Patient Position: Chair   Pulse: 88   Weight: 77.5 kg (170 lb 12.8 oz)   Height: 1.651 m (5' 5\")    See OB flowsheet    Assessment/Plan     Encounter Diagnosis   Name Primary?     HRP (high risk pregnancy), third trimester Yes        Pre termLabor signs discussed. Reinforced daily fetal movement counts.  Reviewed why/how to contact provider if headache/visual changes/RUQ or epigastric pain, decreased fetal movement, vaginal bleeding, leakage of fluid.   Return to clinic in 2 week and prn if questions or concerns.     EVETTE Kelly CNM  "

## 2020-10-02 ENCOUNTER — OFFICE VISIT (OUTPATIENT)
Dept: OBGYN | Facility: CLINIC | Age: 30
End: 2020-10-02
Attending: ADVANCED PRACTICE MIDWIFE
Payer: COMMERCIAL

## 2020-10-02 VITALS
HEART RATE: 91 BPM | SYSTOLIC BLOOD PRESSURE: 107 MMHG | DIASTOLIC BLOOD PRESSURE: 70 MMHG | WEIGHT: 172.3 LBS | BODY MASS INDEX: 28.71 KG/M2 | HEIGHT: 65 IN

## 2020-10-02 DIAGNOSIS — Z87.59 HISTORY OF SHOULDER DYSTOCIA IN PRIOR PREGNANCY: ICD-10-CM

## 2020-10-02 DIAGNOSIS — Z34.93 PREGNANCY WITH PRENATAL CARE ELSEWHERE IN THIRD TRIMESTER: Primary | ICD-10-CM

## 2020-10-02 PROCEDURE — 99207 PR PRENATAL VISIT: CPT | Performed by: ADVANCED PRACTICE MIDWIFE

## 2020-10-02 PROCEDURE — G0463 HOSPITAL OUTPT CLINIC VISIT: HCPCS

## 2020-10-02 ASSESSMENT — MIFFLIN-ST. JEOR: SCORE: 1502.43

## 2020-10-02 NOTE — PROGRESS NOTES
"Subjective:      30 year old  at 34w1d presentst for a routine prenatal appointment.    Denies vaginal bleeding or leakage of fluid.  Reports elisa garay, denies painful or regular contractions. + fetal movement.       No HA, visual changes, RUQ or epigastric pain.     Patient concerns:   - fatigue: reports she is not getting adequate sleep, some nights 4-5 hours.  Wakes up frequently at night due to discomfort and need to urinate.  18 mo daughter has also been waking up frequently at night.  - has been doing hypnobirthing.  - has questions and concerns regarding shoulder dystocia.  Had a shoulder dystocia with first child that lasted 110 seconds.  Baby was 8 lbs, has no effects from dystocia.  Is inquiring about the likelihood of a shoulder dystocia with this delivery.    TDAP given 20   Flu given 20    Objective:  Vitals:    10/02/20 1050   BP: 107/70   BP Location: Right arm   Pulse: 91   Weight: 78.2 kg (172 lb 4.8 oz)   Height: 1.651 m (5' 5\")   See ob flowsheet    Assessment/Plan     Encounter Diagnoses   Name Primary?     Pregnancy with prenatal care elsewhere in third trimester Yes     History of shoulder dystocia in prior pregnancy      Orders Placed This Encounter   Procedures     US OB >14 Weeks Follow Up     No orders of the defined types were placed in this encounter.    ABO   Date Value Ref Range Status   2020 A  Final     RH(D)   Date Value Ref Range Status   2020 Pos  Final     Antibody Screen   Date Value Ref Range Status   2020 negative  Final   - Rhogam:  RH positive, was not given.    - Discussed fatigue, encouraged frequent naps and rest when possible.    - Discussed shoulder dystocia, that while they are unpredictable, we can anticipate and prepare for the potential.    - Reviewed total weight gain, encouraged continued healthy diet and exercise.  .  Reviewed importance of daily fetal kick count and why/how to contact provider.    - Reviewed why/how to " contact provider if headache/visual changes/RUQ or epigastric pain, decreased fetal movement, vaginal bleeding, leakage of fluid or more than 4 contractions in an hour.    - Growth ultrasound at 36 weeks for hx of shoulder dystocia.    Patient education/orders or handouts today:  PTL signs/symptoms  Reviewed GBS screening at 35-36 wks.    Return to clinic in 2 weeks for ANNA and growth ultrasound, and prn if questions or concerns.     I, Sandra Palomares, am serving as a scribe; to document services personally performed by  EVETTE Umana, SATNAM based on data collection and the provider's statements to me.     Sandra Palomares, RN, SNM    I agree with the PFSH and ROS as completed by the student, except for changes made by me. The remainder of the encounter was performed by me and scribed by the student. The scribed note accurately reflects my personal services and decisions made by me.  Allie Borrego DNP, SATNAM, EVETTE

## 2020-10-12 ENCOUNTER — TELEPHONE (OUTPATIENT)
Dept: OBGYN | Facility: CLINIC | Age: 30
End: 2020-10-12

## 2020-10-13 ENCOUNTER — HOSPITAL ENCOUNTER (OUTPATIENT)
Facility: CLINIC | Age: 30
Discharge: HOME | End: 2020-10-13
Attending: ADVANCED PRACTICE MIDWIFE | Admitting: ADVANCED PRACTICE MIDWIFE
Payer: COMMERCIAL

## 2020-10-13 ENCOUNTER — TELEPHONE (OUTPATIENT)
Dept: OBGYN | Facility: CLINIC | Age: 30
End: 2020-10-13

## 2020-10-13 VITALS — DIASTOLIC BLOOD PRESSURE: 70 MMHG | SYSTOLIC BLOOD PRESSURE: 122 MMHG | RESPIRATION RATE: 18 BRPM | TEMPERATURE: 98.3 F

## 2020-10-13 PROCEDURE — 87653 STREP B DNA AMP PROBE: CPT | Performed by: ADVANCED PRACTICE MIDWIFE

## 2020-10-13 PROCEDURE — 99203 OFFICE O/P NEW LOW 30 MIN: CPT | Performed by: ADVANCED PRACTICE MIDWIFE

## 2020-10-13 PROCEDURE — G0463 HOSPITAL OUTPT CLINIC VISIT: HCPCS

## 2020-10-13 NOTE — PLAN OF CARE
Data: Patient presented to the [facility] at 1139.   Reason for maternal/fetal assessment per patient is Abdominal Pain  . Patient is a . Prenatal record reviewed.      OB History    Para Term  AB Living   3 1 1 0 1 1   SAB TAB Ectopic Multiple Live Births   0 0 0 0 1      # Outcome Date GA Lbr Elder/2nd Weight Sex Delivery Anes PTL Lv   3 Current            2 AB 2019 7w0d    SAB      1 Term 19 38w4d  3.629 kg (8 lb) F  Nitrous N KATIE      Birth Comments: shoulder dystocia for about 110 sec, resolved with brad and suprapubic pressure       Complications: Shoulder Dystocia      Name: Francia        Medical History:   Past Medical History:   Diagnosis Date     Allergic rhinitis, cause unspecified    . Gestational Age 35w5d. VSS. Cervix: 3.  Fetal movement present. Patient denies backache, vaginal discharge, pelvic pressure, UTI symptoms, GI problems, bloody show, vaginal bleeding, edema, headache, visual disturbances, epigastric or URQ pain, rupture of membranes.   Action: Verbal consent for EFM. Triage assessment completed. EFM applied for fetal assessment. Uterine assessment irregular contractions. Fetal assessment: Presumed adequate fetal oxygenation documented (see flow record). Patient education pamphlets given on fetal movement counts and discharge instructions. Patient instructed to report change in fetal movement, vaginal leaking of fluid or bleeding, abdominal pain, or any concerns related to the pregnancy to her nurse/physician.   Response: Marva Page CNM informed of pt arrival. Plan per provider is send GBS swab and discharge home undelivered. Patient verbalized understanding of education and verbalized agreement with plan. Discharged ambulatory at 1245.

## 2020-10-13 NOTE — TELEPHONE ENCOUNTER
Received call from Leticia stating she is having rectal/vaginal pressure and its very uncomfortable. She is having cramping around her belly button and when she gets up to walk the cramping goes all through her abdomen.   Denies bleeding, leaking fluid and back cramping. Has good fetal movement.    Tried to reach midwife on call x2 but unable to discuss with her.    Called Leticia back and advised she go to the birthplace for evaluation of pre-term labor. She agreed and states 'it feels like there is a ball pushing on my cervix and rectum'.    Called Birthplace and gave report to Leslie. Pt sees the midwives.

## 2020-10-13 NOTE — DISCHARGE INSTRUCTIONS
Discharge Instruction for Undelivered Patients      You were seen for: Labor Assessment  We Consulted: Marva Patton CNM  You had (Test or Medicine): fetal and uterine monitoring, SVE, GBS collection     Diet:   Drink 8 to 12 glasses of liquids (milk, juice, water) every day.  You may eat meals and snacks.     Activity:  Count fetal kicks everyday (see handout)  Call your doctor or nurse midwife if your baby is moving less than usual.     Call your provider if you notice:  Swelling in your face or increased swelling in your hands or legs.  Headaches that are not relieved by Tylenol (acetaminophen).  Changes in your vision (blurring: seeing spots or stars.)  Nausea (sick to your stomach) and vomiting (throwing up).   Weight gain of 5 pounds or more per week.  Heartburn that doesn't go away.  Signs of bladder infection: pain when you urinate (use the toilet), need to go more often and more urgently.  The bag of guillen (rupture of membranes) breaks, or you notice leaking in your underwear.  Bright red blood in your underwear.  Abdominal (lower belly) or stomach pain.  Second (plus) baby: Contractions (tightening) less than 10 minutes apart and getting stronger.  Increase or change in vaginal discharge (note the color and amount)      Follow-up:  As scheduled in the clinic

## 2020-10-13 NOTE — PROGRESS NOTES
HOSPITAL TRIAGE NOTE  ===================    CHIEF COMPLAINT  ========================  Leticia Ngo is a 30 year old patient presenting today at 35w5d for evaluation of pelvic pain, and pressure, some mild contx occasionally .    Patient's last menstrual period was 2020.  Estimated Date of Delivery: 2020       HPI  ==================     Prenatal record and labs reviewed from [facility], through [employer] EMR.    CONTRACTIONS: irreg, mild and cramping  ABDOMINAL PAIN: pressure  FETAL MOVEMENT: active    VAGINAL BLEEDING: none  RUPTURE OF MEMBRANES: no  PELVIC PAIN: pressure    PREGNANCY COMPLICATIONS: none  OTHER:     # Pain Assessment:  Current Pain Score 10/13/2020   Patient currently in pain? yes   Leticia chaves pain level was assessed and she currently denies pain.        REVIEW OF SYSTEMS  =====================  C: NEGATIVE for fever, chills  I: NEGATIVE for worrisome rashes, moles or lesions  E: NEGATIVE for vision changes or irritation  R: NEGATIVE for significant cough or SOB  CV: NEGATIVE for chest pain, palpitations or varicosities  GI: NEGATIVE for nausea, abdominal pain, heartburn, or change in bowel habits  : NEGATIVE for frequency, dysuria, or hematuria  M: NEGATIVE for significant arthralgias or myalgia  N: NEGATIVE for headache, weakness, dizziness or paresthesias  P: NEGATIVE for changes in mood or affect    PROBLEM LIST  ===============  Patient Active Problem List    Diagnosis Date Noted     Supervision of other normal pregnancy 2020     Priority: High     FOB Miki  Daughter Francia MARTÍNEZ from [place] - consent signed for labs, please follow up. May need to draw thyroid if not in the labs, pt unsure  h/o shoulder dystocia - growth at 36 weeks     [facility] CNM pt  Partner's name: Miki  [x] Entered on Epic list  [X] NOB folder  [X] First tri screen declined  [NA] Started ASA   [X] Fetal anatomy US ordered  [ ] Rubella immune  [ ] Hep B  immune/nonimmune  [X] Pap 2020 NILM, HPV neg  [X] NO plan utox in labor   [X] QS/AFP declined  _____________________________________  [ ] EOB folder  [ ] PP Contraception plan: If tubal,consent date:  [ ] Labor plans:  [ ] :  [ ] Infant feeding plan  [x ] FLU shot  [x ] TDAP given  [NA ] Rhogam   [NA ] TOLAC   [ ] Waterbirth declines,consent done  [ ] GCT, passed  ________________________________________  [ ] GBS pos; neg  [ ] OTC PP meds sent             Pregnancy with prenatal care elsewhere 06/01/2020     Priority: Medium     History of shoulder dystocia in prior pregnancy 05/07/2020     Priority: Medium     She has a history of a shoulder dystocia. She reports she progressed very quickly from 4 to 10 cm. She was on hands and knees and they asked her to rotate to her back, did suprapubic pressure and Omero and shoulder dystocia resolved after about 110 sec. She had a good discussion on it with the [place] [facility] so has good details. No injury to her daughter Francia.       Acid reflux 11/30/2018     Priority: Medium     Ovarian cyst 07/09/2010     Priority: Medium     Fibrocystic breast disease 03/26/2009     Priority: Medium     Nonspecific abnormal results of thyroid function study 07/07/2006     Priority: Medium     Age 16, no problems since  past to check TSH has been borderline low - no real  symptoms and normal exam -had intended check last visit - but apparently didn't get enough blood - recheck       Allergic rhinitis due to other allergen 06/28/2006     Priority: Medium       HISTORIES  ==============  ALLERGIES:      Allergies   Allergen Reactions     Ciprofloxacin Nausea and Vomiting     Hydrocodone Nausea and Vomiting     Vicodin [Acetaminophen] Nausea and Vomiting     PAST MEDICAL HISTORY  Past Medical History:   Diagnosis Date     Allergic rhinitis, cause unspecified      SOCIAL HISTORY  Social History     Socioeconomic History     Marital status:      Spouse name: Miki Paris  of children: 1     Years of education: Not on file     Highest education level: Not on file   Occupational History     Comment: annie   Social Needs     Financial resource strain: Not on file     Food insecurity     Worry: Not on file     Inability: Not on file     Transportation needs     Medical: Not on file     Non-medical: Not on file   Tobacco Use     Smoking status: Never Smoker     Smokeless tobacco: Never Used   Substance and Sexual Activity     Alcohol use: Not Currently     Comment: 5 drinks per week, but doesn't drink every week     Drug use: No     Sexual activity: Yes     Partners: Male   Lifestyle     Physical activity     Days per week: Not on file     Minutes per session: Not on file     Stress: Not at all   Relationships     Social connections     Talks on phone: Not on file     Gets together: Not on file     Attends Taoist service: Not on file     Active member of club or organization: Not on file     Attends meetings of clubs or organizations: Not on file     Relationship status: Not on file     Intimate partner violence     Fear of current or ex partner: No     Emotionally abused: No     Physically abused: No     Forced sexual activity: No   Other Topics Concern     Parent/sibling w/ CABG, MI or angioplasty before 65F 55M? No   Social History Narrative     Not on file       FAMILY HISTORY  Family History   Problem Relation Age of Onset     Other - See Comments Mother         ovarian cysts     Skin Cancer Mother         nose, removed      Lipids Father      Diabetes Father      Mental Illness Father      Personality Disorder Father         borderline personality disorder      Cancer - colorectal Maternal Grandfather      Neurologic Disorder Maternal Grandfather         parkinson     Diabetes Paternal Grandmother      Cancer - colorectal Paternal Grandfather      Diabetes Paternal Grandfather      CJairAROMINA. Paternal Uncle         MI, by pass     RAE Paternal Uncle         MI       OB HISTORY  OB History    Para Term  AB Living   3 1 1 0 1 1   SAB TAB Ectopic Multiple Live Births   0 0 0 0 1      # Outcome Date GA Lbr Elder/2nd Weight Sex Delivery Anes PTL Lv   3 Current            2 AB 2019 7w0d    SAB      1 Term 19 38w4d  3.629 kg (8 lb) F  Nitrous N KATIE      Birth Comments: shoulder dystocia for about 110 sec, resolved with brad and suprapubic pressure       Complications: Shoulder Dystocia      Name: Francia       Prenatal Labs:   Lab Results   Component Value Date    ABO A 2020    RH Pos 2020    AS negative 2020    HEPBANG negative 2020    TREPAB Negative 2009    RUQIGG immune 2020    HGB 12.1 2020    HIV Negative 2009         EXAM  ============  BP (P) 122/70   Temp (P) 98.3  F (36.8  C) (Oral)   Resp (P) 18   LMP 2020   GENERAL APPEARANCE: healthy, alert and no distress  RESP: lungs clear to auscultation - no rales, rhonchi or wheezes  CV: regular rates and rhythm, normal S1 S2, no S3 or S4 and no murmur,and no varicosities  ABDOMEN:  soft, nontender, no epigastric pain  SKIN: no suspicious lesions or rashes  NEURO: Denies headache, blurred vision, other vision changes  PSYCH: mentation appears normal. and affect normal/bright  MS/ LEGS: Reflexes normal bilaterally    CONTRACTIONS: irreg and cramping   FETAL HEART TONES: continuous EFM- baseline 130 with moderate variability and positive accelerations. No decelerations.  NST: NOT DONE    PELVIC EXAM: %/ Posterior/ soft/ -3     PRESENTATION: VERTEX  BLOOD: no  DISCHARGE: clear    ROM: no  ROMPLUS: not done    LABS: GBS  Lab results reviewed-   DIAGNOSIS  ============  35w5d seen on the [facility] Triage for labor evaluation- no contx,     NST: NOT DONE  Fetal Heart rate tracing:category one    PLAN  ============  Discharge to home with labor instuctions per discharge instruction form  Call or return to the [facility] with contractions,  cramping, abdominal or pelvic pain, vaginal bleeding, leaking fluid or decreased fetal movement.  Follow up at your next clinic visit- Friday 10/16  Marva Patton, APRN [facility]     Advancement Flap (Double) Text: The defect edges were debeveled with a #15 scalpel blade.  Given the location of the defect and the proximity to free margins a double advancement flap was deemed most appropriate.  Using a sterile surgical marker, the appropriate advancement flaps were drawn incorporating the defect and placing the expected incisions within the relaxed skin tension lines where possible.    The area thus outlined was incised deep to adipose tissue with a #15 scalpel blade.  The skin margins were undermined to an appropriate distance in all directions utilizing iris scissors.

## 2020-10-14 LAB
GP B STREP DNA SPEC QL NAA+PROBE: NEGATIVE
SPECIMEN SOURCE: NORMAL

## 2020-10-16 ENCOUNTER — OFFICE VISIT (OUTPATIENT)
Dept: OBGYN | Facility: CLINIC | Age: 30
End: 2020-10-16
Attending: ADVANCED PRACTICE MIDWIFE
Payer: COMMERCIAL

## 2020-10-16 ENCOUNTER — ANCILLARY PROCEDURE (OUTPATIENT)
Dept: ULTRASOUND IMAGING | Facility: CLINIC | Age: 30
End: 2020-10-16
Attending: ADVANCED PRACTICE MIDWIFE
Payer: COMMERCIAL

## 2020-10-16 VITALS
WEIGHT: 176.5 LBS | DIASTOLIC BLOOD PRESSURE: 75 MMHG | SYSTOLIC BLOOD PRESSURE: 118 MMHG | BODY MASS INDEX: 29.37 KG/M2 | HEART RATE: 101 BPM

## 2020-10-16 DIAGNOSIS — Z87.59 HISTORY OF SHOULDER DYSTOCIA IN PRIOR PREGNANCY: ICD-10-CM

## 2020-10-16 DIAGNOSIS — Z34.93 PREGNANCY WITH PRENATAL CARE ELSEWHERE IN THIRD TRIMESTER: ICD-10-CM

## 2020-10-16 DIAGNOSIS — Z34.80 SUPERVISION OF OTHER NORMAL PREGNANCY: Primary | ICD-10-CM

## 2020-10-16 PROBLEM — O36.60X0 LGA (LARGE FOR GESTATIONAL AGE) FETUS AFFECTING MANAGEMENT OF MOTHER: Status: ACTIVE | Noted: 2020-10-16

## 2020-10-16 PROCEDURE — 76816 OB US FOLLOW-UP PER FETUS: CPT | Mod: 26 | Performed by: OBSTETRICS & GYNECOLOGY

## 2020-10-16 PROCEDURE — 76816 OB US FOLLOW-UP PER FETUS: CPT

## 2020-10-16 PROCEDURE — 99207 PR PRENATAL VISIT: CPT | Performed by: ADVANCED PRACTICE MIDWIFE

## 2020-10-16 PROCEDURE — G0463 HOSPITAL OUTPT CLINIC VISIT: HCPCS

## 2020-10-16 NOTE — PROGRESS NOTES
Subjective:     30 year old  at 36w1d presents for a routine prenatal appointment.         Denies vaginal bleeding.  Had some leakage of fluid on her drive here, she reports urinary incontinence has been an ongoing issue for her and she does believe that this was urine. or change in vaginal discharge.  Denies contractions.  Endorses fetal movement.     No HA, visual changes, RUQ or epigastric pain.   Patient concerns: Leticia is feeling well and is focusing on a different birth experience with this baby. She is recommitting to a healthy diet and getting some walks in everyday.  Feeling well overall.     Objective:  Vitals:    10/16/20 1113   BP: 118/75   BP Location: Right arm   Patient Position: Chair   Pulse: 101   Weight: 80.1 kg (176 lb 8 oz)   See OB flowsheet    US today: LOA=0163, 80% for 36 wks. BPD = 95%, AC = 98%  MVP= 3.3 cm   MVP=3.3cm    Assessment/Plan     Encounter Diagnoses   Name Primary?     Supervision of other normal pregnancy Yes     History of shoulder dystocia in prior pregnancy      GBS screening: negative  Recommended Flu Vaccine.  Patient already received Flu Vaccine   Labor signs discussed. Reinforced daily fetal movement counts.  Reviewed pooling and s/s of PROM, she will be aware.  Reviewed why/how to contact provider if headache/visual changes/RUQ or epigastric pain, decreased fetal movement, vaginal bleeding, leakage of fluid.  Return to clinic in 1 week and prn if questions or concerns.     Allie Borrego CNM

## 2020-10-16 NOTE — NURSING NOTE
Chief Complaint   Patient presents with     Prenatal Care     36w1d       See MARCI Vega 10/16/2020

## 2020-10-16 NOTE — LETTER
10/16/2020       RE: Leticia Ngo  [address]  [place] [address]     Dear Colleague,    Thank you for referring your patient, Leticia Ngo, to the [facility] [place] at [facility]. Please see a copy of my visit note below.    Subjective:     30 year old  at 36w1d presents for a routine prenatal appointment.         Denies vaginal bleeding.  Had some leakage of fluid on her drive here, she reports urinary incontinence has been an ongoing issue for her and she does believe that this was urine. or change in vaginal discharge.  Denies contractions.  Endorses fetal movement.     No HA, visual changes, RUQ or epigastric pain.   Patient concerns: Leticia is feeling well and is focusing on a different birth experience with this baby. She is recommitting to a healthy diet and getting some walks in everyday.  Feeling well overall.     Objective:  Vitals:    10/16/20 1113   BP: 118/75   BP Location: Right arm   Patient Position: Chair   Pulse: 101   Weight: 80.1 kg (176 lb 8 oz)   See OB flowsheet    US today: ZUE=1290, 80% for 36 wks. BPD = 95%, AC = 98%  MVP= 3.3 cm   MVP=3.3cm    Assessment/Plan     Encounter Diagnoses   Name Primary?     Supervision of other normal pregnancy Yes     History of shoulder dystocia in prior pregnancy      GBS screening: negative  Recommended Flu Vaccine.  Patient already received Flu Vaccine   Labor signs discussed. Reinforced daily fetal movement counts.  Reviewed pooling and s/s of PROM, she will be aware.  Reviewed why/how to contact provider if headache/visual changes/RUQ or epigastric pain, decreased fetal movement, vaginal bleeding, leakage of fluid.  Return to clinic in 1 week and prn if questions or concerns.     Allie Borrego CNM

## 2020-10-22 ENCOUNTER — OFFICE VISIT (OUTPATIENT)
Dept: OBGYN | Facility: CLINIC | Age: 30
End: 2020-10-22
Attending: ADVANCED PRACTICE MIDWIFE
Payer: COMMERCIAL

## 2020-10-22 VITALS
HEIGHT: 65 IN | WEIGHT: 179 LBS | HEART RATE: 86 BPM | BODY MASS INDEX: 29.82 KG/M2 | DIASTOLIC BLOOD PRESSURE: 76 MMHG | SYSTOLIC BLOOD PRESSURE: 112 MMHG

## 2020-10-22 DIAGNOSIS — Z34.80 SUPERVISION OF OTHER NORMAL PREGNANCY: Primary | ICD-10-CM

## 2020-10-22 DIAGNOSIS — B37.31 CANDIDIASIS OF VAGINA: ICD-10-CM

## 2020-10-22 LAB
CLUE CELLS: NEGATIVE
TRICHOMONAS (WET PREP): NEGATIVE
YEAST (WET PREP): POSITIVE

## 2020-10-22 PROCEDURE — G0463 HOSPITAL OUTPT CLINIC VISIT: HCPCS

## 2020-10-22 PROCEDURE — 99207 PR PRENATAL VISIT: CPT | Performed by: ADVANCED PRACTICE MIDWIFE

## 2020-10-22 PROCEDURE — 87210 SMEAR WET MOUNT SALINE/INK: CPT | Performed by: ADVANCED PRACTICE MIDWIFE

## 2020-10-22 ASSESSMENT — MIFFLIN-ST. JEOR: SCORE: 1532.82

## 2020-10-22 ASSESSMENT — PAIN SCALES - GENERAL: PAINLEVEL: NO PAIN (0)

## 2020-10-22 NOTE — PROGRESS NOTES
"Subjective:       30 year old  at 37w0d presents for routine prenatal visit.     Patient concerns: Feeling well overall. Does note increased vaginal discharge over the last few days. Sometimes looks watery.    History of PROM in prior pregnancy, which felt different. No vaginal irritation or itching. Prefers not to go to BP for evaluation- agreeable to speculum exam in clinic.     Denies contractions and vaginal bleeding. Reports normal fetal movements. No HA, vision changes, or ruq/epigastric pain.      She did present to the ED one week ago for pelvic pain. Was found to be dilated to 1 cm and work up was grossly negative. She was discharged- at that time and instructed to continue routine prenatal care. Pain is still present but significantly improved since.     Objective:    Vitals:    10/22/20 1525   BP: 112/76   Pulse: 86   Weight: 81.2 kg (179 lb)   Height: 1.651 m (5' 5\")      See OB flowsheet    GENERAL: gravid middle age female, no acute distress.  LUNGS: clear to auscultation, no crackles, no wheeze  HEART: regular rate and rhythm, no murmur  NEUROLOGIC: alert and orientated  PSYCHIATRIC: normal mood and affect  ABDOMEN: 36 week uterus, baby in vertex position    SSE:  Negative pooling, neg valsalva/cough, neg ferning  Moderate amt of thick white discharge in vaginal vault  Cervix visually appears closed  Vaginal walls tender to touch with qtip    Wet Prep: ph 3.6, hyphae, buds, neg trich, neg clue cells, neg whiff     Assessment/Plan     1. Supervision of normal pregnancy  2. Vaginal candidiasis    - Reviewed why/how to contact provider if headache/visual changes/RUQ or epigastric pain, decreased fetal movement, vaginal bleeding, leakage of fluid or strong/regular contractions.   Patient education/orders or handouts today: none    - Reviewed exam findings positive for candidiasis.   Prescription sent for miconazole PV x 7 days.    - Continue scheduled prenatal care, RTC in 1 week and prn if " questions or concerns.      I, Mahesh Pierson, am serving as a scribe; to document services personally performed by  EVETTE Majano CNM based on data collection and the provider's statements to me. - Mahesh Pierson    The encounter was performed by me and scribed by the medical student. The scribed note accurately reflects my personal services and decisions made by me.     EVETTE Majano CNM

## 2020-10-22 NOTE — LETTER
"10/22/2020       RE: Leticia Ngo  [address]  [place] [address]     Dear Colleague,    Thank you for referring your patient, Leticia Ngo, to the [facility] [place] at [facility]. Please see a copy of my visit note below.    Subjective:       30 year old  at 37w0d presents for routine prenatal visit.     Patient concerns: Feeling well overall. Does note increased vaginal discharge over the last few days. Sometimes looks watery.    History of PROM in prior pregnancy, which felt different. No vaginal irritation or itching. Prefers not to go to BP for evaluation- agreeable to speculum exam in clinic.     Denies contractions and vaginal bleeding. Reports normal fetal movements. No HA, vision changes, or ruq/epigastric pain.      She did present to the ED one week ago for pelvic pain. Was found to be dilated to 1 cm and work up was grossly negative. She was discharged- at that time and instructed to continue routine prenatal care. Pain is still present but significantly improved since.     Objective:    Vitals:    10/22/20 1525   BP: 112/76   Pulse: 86   Weight: 81.2 kg (179 lb)   Height: 1.651 m (5' 5\")      See OB flowsheet    GENERAL: gravid middle age female, no acute distress.  LUNGS: clear to auscultation, no crackles, no wheeze  HEART: regular rate and rhythm, no murmur  NEUROLOGIC: alert and orientated  PSYCHIATRIC: normal mood and affect  ABDOMEN: 36 week uterus, baby in vertex position    SSE:  Negative pooling, neg valsalva/cough, neg ferning  Moderate amt of thick white discharge in vaginal vault  Cervix visually appears closed  Vaginal walls tender to touch with qtip    Wet Prep: ph 3.6, hyphae, buds, neg trich, neg clue cells, neg whiff     Assessment/Plan     1. Supervision of normal pregnancy  2. Vaginal candidiasis    - Reviewed why/how to contact provider if headache/visual changes/RUQ or epigastric pain, " decreased fetal movement, vaginal bleeding, leakage of fluid or strong/regular contractions.   Patient education/orders or handouts today: none    - Reviewed exam findings positive for candidiasis.   Prescription sent for miconazole PV x 7 days.    - Continue scheduled prenatal care, RTC in 1 week and prn if questions or concerns.      I, Mahesh Pierson, am serving as a scribe; to document services personally performed by  EVETTE Majano CNM based on data collection and the provider's statements to me. - Mahesh Pierson    The encounter was performed by me and scribed by the medical student. The scribed note accurately reflects my personal services and decisions made by me.     EVETTE Majano CNM

## 2020-10-22 NOTE — NURSING NOTE
Chief Complaint   Patient presents with     Prenatal Care     ANNA 37 weeks and   Ora Santillan LPN

## 2020-10-26 RX ORDER — MICONAZOLE NITRATE 2 %
1 CREAM WITH APPLICATOR VAGINAL AT BEDTIME
Qty: 45 G | Refills: 0 | Status: SHIPPED | OUTPATIENT
Start: 2020-10-26 | End: 2020-10-28

## 2020-10-28 ENCOUNTER — OFFICE VISIT (OUTPATIENT)
Dept: OBGYN | Facility: CLINIC | Age: 30
End: 2020-10-28
Attending: MIDWIFE
Payer: COMMERCIAL

## 2020-10-28 ENCOUNTER — HOSPITAL ENCOUNTER (OUTPATIENT)
Facility: CLINIC | Age: 30
Discharge: HOME | End: 2020-10-28
Attending: ADVANCED PRACTICE MIDWIFE | Admitting: ADVANCED PRACTICE MIDWIFE
Payer: COMMERCIAL

## 2020-10-28 VITALS — SYSTOLIC BLOOD PRESSURE: 121 MMHG | DIASTOLIC BLOOD PRESSURE: 80 MMHG | TEMPERATURE: 98.3 F | HEART RATE: 121 BPM

## 2020-10-28 VITALS
BODY MASS INDEX: 29.54 KG/M2 | HEIGHT: 65 IN | WEIGHT: 177.3 LBS | HEART RATE: 85 BPM | SYSTOLIC BLOOD PRESSURE: 117 MMHG | DIASTOLIC BLOOD PRESSURE: 82 MMHG

## 2020-10-28 DIAGNOSIS — Z87.59 HISTORY OF SHOULDER DYSTOCIA IN PRIOR PREGNANCY: ICD-10-CM

## 2020-10-28 DIAGNOSIS — B37.31 CANDIDIASIS OF VAGINA: ICD-10-CM

## 2020-10-28 DIAGNOSIS — Z34.80 SUPERVISION OF OTHER NORMAL PREGNANCY: Primary | ICD-10-CM

## 2020-10-28 LAB — RUPTURE OF FETAL MEMBRANES BY ROM PLUS: NEGATIVE

## 2020-10-28 PROCEDURE — G0463 HOSPITAL OUTPT CLINIC VISIT: HCPCS

## 2020-10-28 PROCEDURE — 99207 PR PRENATAL VISIT: CPT | Performed by: MIDWIFE

## 2020-10-28 PROCEDURE — 99214 OFFICE O/P EST MOD 30 MIN: CPT | Mod: 25 | Performed by: ADVANCED PRACTICE MIDWIFE

## 2020-10-28 PROCEDURE — 84112 EVAL AMNIOTIC FLUID PROTEIN: CPT | Performed by: ADVANCED PRACTICE MIDWIFE

## 2020-10-28 PROCEDURE — 59025 FETAL NON-STRESS TEST: CPT | Mod: 26 | Performed by: ADVANCED PRACTICE MIDWIFE

## 2020-10-28 RX ORDER — ONDANSETRON 2 MG/ML
4 INJECTION INTRAMUSCULAR; INTRAVENOUS EVERY 6 HOURS PRN
Status: DISCONTINUED | OUTPATIENT
Start: 2020-10-28 | End: 2020-10-28 | Stop reason: HOSPADM

## 2020-10-28 ASSESSMENT — MIFFLIN-ST. JEOR: SCORE: 1525.11

## 2020-10-28 ASSESSMENT — PAIN SCALES - GENERAL: PAINLEVEL: NO PAIN (0)

## 2020-10-28 NOTE — LETTER
"10/28/2020       RE: Leticia Ngo  [address]  [place] [address]     Dear Colleague,    Thank you for referring your patient, Leticia Ngo, to the [facility] [place] at [facility]. Please see a copy of my visit note below.    Subjective:     30 year old  at 37w6d presents for routine prenatal visit.          no vaginal bleeding or leakage of fluid.  Having occ  contractions.  Good  fetal movement.        No HA, visual changes, RUQ or epigastric pain.   Patient concerns: pt has ? Watery fluid noted a change today  ? Cant tell if still yeast infection pt had wet prep + yease last visit pt declined ROM + eval  CNM did fern under microscope Neg   Pt is wondering about primary C/s  Hx SD x 110 sec without fetal injury  Pt is getting more nervous now    Wondering about recommnedations  Will order repeat growth and discuss pt with MD at HR rounds today  Pt wondering about IOL ?   Will PLAN MD AT BIRTH   EFW today .   Willing to go to BP for ROM + assessment     Feeling well overall.  Objective:  Vitals:    10/28/20 1146   BP: 117/82   Pulse: 85   Weight: 80.4 kg (177 lb 4.8 oz)   Height: 1.651 m (5' 5\")    See OB flowsheet  Assessment/Plan   (Z34.80) Supervision of other normal pregnancy  (primary encounter diagnosis)  Plan: US OB >14 Weeks Follow Up  JREPEAT GROWTH US AT 39 WKS          - Reviewed postdates testing including BPP => 41 weeks and rationale for induction of labor based on results.   Patient unsure about  induction or postdates testing.  - Reviewed why/how to contact provider if headache/visual changes/RUQ or epigastric pain, decreased fetal movement, vaginal bleeding, leakage of fluid or strong/regular contractions.   Patient education/orders or handouts today:  Sign/symptoms of labor and When to call for labor or other concerns  Return to clinic in 1 week and prn if questions or concerns.   Radha Covington, APRN " CNM      Addendum:  Per MD consult at HR rounds with ZAID Long and Dr Oropeza   Per MD with hx of SD can offer primaru C/S  But not indicated to induce research does not show lower rates of shoulder dystocia with IOL     If pt is ripe cacn consider 39 wk elective IOL     Opal Adria CNM APRN

## 2020-10-28 NOTE — PLAN OF CARE
Patient arrived to unit at 1225. Valentina Darling CNM notified of patient arrival. EFM applied, Vitals taken and stable, assessment WNL. Patient reported gush of fluid while sitting at her desk this morning, nothing since. Denies bleeding. Reports elisa Fraser contractions but not very strong. ROM plus sent. EFM tracing WNL. ROM plus came back negative, CNM discussed with patient. Patient discharged at 1340 with reading materials on symptoms of labor and rupture of membranes.

## 2020-10-28 NOTE — PROGRESS NOTES
CHIEF COMPLAINT  ========================  Leticia Ngo is a 30 year old patient presenting today at 37w6d for evaluation of Leaking vaginal fluid.     Patient's last menstrual period was 02/06/2020.  Estimated Date of Delivery: Nov 12, 2020     HPI  ==================   Patient states that she was at work today, stood up an noticed a wet spot on her chair.  Was seen in clinic for a routine prenatal visit and mentioned change in discharge, so was sent over for evaluation.  CONTRACTIONS: not felt by patient  ABDOMINAL PAIN: none  FETAL MOVEMENT: active  VAGINAL BLEEDING: none  RUPTURE OF MEMBRANES: uncertain  PELVIC PAIN: none  OTHER:   REVIEW OF SYSTEMS  =====================  C: NEGATIVE for fever, chills  I: NEGATIVE for worrisome rashes, moles or lesions  E: NEGATIVE for vision changes or irritation  R: NEGATIVE for significant cough or SOB  CV: NEGATIVE for chest pain, palpitations or varicosities  GI: NEGATIVE for nausea, abdominal pain, heartburn, or change in bowel habits  : NEGATIVE for frequency, dysuria, or hematuria  M: NEGATIVE for significant arthralgias or myalgia  N: NEGATIVE for headache, weakness, dizziness or paresthesias  P: NEGATIVE for changes in mood or affect  HISTORIES  ==============  ALLERGIES:    Allergies   Allergen Reactions     Ciprofloxacin Nausea and Vomiting     Hydrocodone Nausea and Vomiting     Vicodin [Acetaminophen] Nausea and Vomiting     PAST MEDICAL HISTORY  Past Medical History:   Diagnosis Date     Allergic rhinitis, cause unspecified      PARTNER: involved  FAMILY HISTORY  Family History   Problem Relation Age of Onset     Other - See Comments Mother         ovarian cysts     Skin Cancer Mother         nose, removed      Lipids Father      Diabetes Father      Mental Illness Father      Personality Disorder Father         borderline personality disorder      Cancer - colorectal Maternal Grandfather      Neurologic Disorder Maternal Grandfather         parkinson      Diabetes Paternal Grandmother      Cancer - colorectal Paternal Grandfather      Diabetes Paternal Grandfather      RAE Paternal Uncle         MI, by pass     ARMEN. Paternal Uncle         MI      OB HISTORY  Prenatal record reviewed in Epic.  EXAM  ============  Vital signs stable, afebrile and BP is WNL  GENERAL APPEARANCE: healthy, alert and no distress  PSYCH: mentation appears normal. and affect normal/bright  LEGS: No edema  CONTRACTIONS: none  FETAL HEART TONES:  135 with moderate long term variability, accelerations-yes, decels none.  NST: REACTIVE  EFW:7  PELVIC EXAM: deferred  ROM+ sent      DIAGNOSIS  ============  37w6d, Category 1 fetal tracing  Negative ROM+    PLAN  ============  1.  Discharge to home  2.  Keep planned clinic visit next week.    EVETTE Kelly CNM

## 2020-10-28 NOTE — PROGRESS NOTES
"Subjective:     30 year old  at 37w6d presents for routine prenatal visit.          no vaginal bleeding or leakage of fluid.  Having occ  contractions.  Good  fetal movement.        No HA, visual changes, RUQ or epigastric pain.   Patient concerns: pt has ? Watery fluid noted a change today  ? Cant tell if still yeast infection pt had wet prep + yease last visit pt declined ROM + eval  CNM did fern under microscope Neg   Pt is wondering about primary C/s  Hx SD x 110 sec without fetal injury  Pt is getting more nervous now    Wondering about recommnedations  Will order repeat growth and discuss pt with MD at HR rounds today  Pt wondering about IOL ?   Will PLAN MD AT BIRTH   EFW today .   Willing to go to BP for ROM + assessment     Feeling well overall.  Objective:  Vitals:    10/28/20 1146   BP: 117/82   Pulse: 85   Weight: 80.4 kg (177 lb 4.8 oz)   Height: 1.651 m (5' 5\")    See OB flowsheet  Assessment/Plan   (Z34.80) Supervision of other normal pregnancy  (primary encounter diagnosis)  Plan: US OB >14 Weeks Follow Up  JREPEAT GROWTH US AT 39 WKS          - Reviewed postdates testing including BPP => 41 weeks and rationale for induction of labor based on results.   Patient unsure about  induction or postdates testing.  - Reviewed why/how to contact provider if headache/visual changes/RUQ or epigastric pain, decreased fetal movement, vaginal bleeding, leakage of fluid or strong/regular contractions.   Patient education/orders or handouts today:  Sign/symptoms of labor and When to call for labor or other concerns  Return to clinic in 1 week and prn if questions or concerns.   Radha Covington, EVETTE CNM      Addendum:  Per MD consult at HR rounds with ZAID Long and Dr Oropeza   Per MD with hx of SD can offer primaru C/S  But not indicated to induce research does not show lower rates of shoulder dystocia with IOL     If pt is ripe cacn consider 39 wk elective IOL     Opal Covington CNM APRN   "

## 2020-10-29 ENCOUNTER — TELEPHONE (OUTPATIENT)
Dept: OBGYN | Facility: CLINIC | Age: 30
End: 2020-10-29

## 2020-10-29 NOTE — TELEPHONE ENCOUNTER
Leticia is returning a call from Opal Covington.  States that she wanted to discuss MD recommendations.    Routed to midwife pool

## 2020-11-03 ENCOUNTER — TELEPHONE (OUTPATIENT)
Dept: OBGYN | Facility: CLINIC | Age: 30
End: 2020-11-03

## 2020-11-03 ENCOUNTER — OFFICE VISIT (OUTPATIENT)
Dept: OBGYN | Facility: CLINIC | Age: 30
End: 2020-11-03
Attending: OBSTETRICS & GYNECOLOGY
Payer: COMMERCIAL

## 2020-11-03 ENCOUNTER — NURSE TRIAGE (OUTPATIENT)
Dept: NURSING | Facility: CLINIC | Age: 30
End: 2020-11-03

## 2020-11-03 VITALS
HEART RATE: 102 BPM | SYSTOLIC BLOOD PRESSURE: 132 MMHG | DIASTOLIC BLOOD PRESSURE: 79 MMHG | WEIGHT: 176 LBS | BODY MASS INDEX: 29.29 KG/M2

## 2020-11-03 DIAGNOSIS — O36.60X0 EXCESSIVE FETAL GROWTH AFFECTING MANAGEMENT OF PREGNANCY, ANTEPARTUM, SINGLE OR UNSPECIFIED FETUS: ICD-10-CM

## 2020-11-03 DIAGNOSIS — Z87.59 HISTORY OF SHOULDER DYSTOCIA IN PRIOR PREGNANCY: ICD-10-CM

## 2020-11-03 DIAGNOSIS — Z11.59 ENCOUNTER FOR SCREENING FOR OTHER VIRAL DISEASES: Primary | ICD-10-CM

## 2020-11-03 DIAGNOSIS — Z34.80 SUPERVISION OF OTHER NORMAL PREGNANCY: ICD-10-CM

## 2020-11-03 DIAGNOSIS — O09.293 HISTORY OF SHOULDER DYSTOCIA IN PRIOR PREGNANCY, CURRENTLY PREGNANT IN THIRD TRIMESTER: Primary | ICD-10-CM

## 2020-11-03 PROCEDURE — G0463 HOSPITAL OUTPT CLINIC VISIT: HCPCS

## 2020-11-03 PROCEDURE — 99207 PR PRENATAL VISIT: CPT | Performed by: OBSTETRICS & GYNECOLOGY

## 2020-11-03 RX ORDER — SODIUM CHLORIDE, SODIUM LACTATE, POTASSIUM CHLORIDE, CALCIUM CHLORIDE 600; 310; 30; 20 MG/100ML; MG/100ML; MG/100ML; MG/100ML
INJECTION, SOLUTION INTRAVENOUS CONTINUOUS
Status: CANCELLED | OUTPATIENT
Start: 2020-11-03

## 2020-11-03 RX ORDER — LIDOCAINE 40 MG/G
CREAM TOPICAL
Status: CANCELLED | OUTPATIENT
Start: 2020-11-03

## 2020-11-03 RX ORDER — CEFAZOLIN SODIUM 1 G/3ML
1 INJECTION, POWDER, FOR SOLUTION INTRAMUSCULAR; INTRAVENOUS SEE ADMIN INSTRUCTIONS
Status: CANCELLED | OUTPATIENT
Start: 2020-11-03

## 2020-11-03 RX ORDER — MICONAZOLE NITRATE 2 %
CREAM WITH APPLICATOR VAGINAL
COMMUNITY
Start: 2020-10-27 | End: 2020-11-25

## 2020-11-03 RX ORDER — CITRIC ACID/SODIUM CITRATE 334-500MG
30 SOLUTION, ORAL ORAL
Status: CANCELLED | OUTPATIENT
Start: 2020-11-03

## 2020-11-03 RX ORDER — ACETAMINOPHEN 325 MG/1
975 TABLET ORAL ONCE
Status: CANCELLED | OUTPATIENT
Start: 2020-11-03 | End: 2020-11-03

## 2020-11-03 RX ORDER — CEFAZOLIN SODIUM 2 G/100ML
2 INJECTION, SOLUTION INTRAVENOUS
Status: CANCELLED | OUTPATIENT
Start: 2020-11-03

## 2020-11-03 ASSESSMENT — PAIN SCALES - GENERAL: PAINLEVEL: NO PAIN (0)

## 2020-11-03 NOTE — LETTER
11/3/2020       RE: Leticia Ngo  [address]  [facility] [address]     Dear Colleague,    Thank you for referring your patient, Leticia Ngo, to the [facility] [place] [facility]. Please see a copy of my visit note below.    Pt here to discuss plans for birth.  She has h/o shoulder dystocia in prior pregnancy, so knows she is at risk for recurrence in this pregnancy.  She is 38+5 today, though the rooming sheet was mismarked with GA 32+ wks rather than her actual GA.      She is worried about her decision and is looking for guidance re: spont vaginal delivery vs IOL vs scheduled c/s for her h/o shoulder dystocia in the past.     Past Medical History:   Diagnosis Date     Allergic rhinitis, cause unspecified      Uncomplicated asthma      Past Surgical History:   Procedure Laterality Date     APPENDECTOMY       appendicitis  2019     C ORAL SURGERY PROCEDURE      [place] teeth     ObHX:  w/ >100 second dystocia w/o birth injury.    Ultrasound from 10/16/2020: EFW: 80% w/ AC 98% and BPD 95%.    /79   Pulse 102   Wt 79.8 kg (176 lb)   LMP 2020   Breastfeeding No   BMI 29.29 kg/m    See flow    A/p: 29 yo G 3  at 38+5 wks, here for recommendations re: primary c/s vs IOL vs  d/t h/o shoulder dystocia in prior pregnancy.    We discussed at length the difficulty in predicting recurrent shoulder dystocia. Certainly patients with a prior shoulder dystocia can opt to schedule a primary . I did offer this to her today. She has a repeat ultrasound scheduled tomorrow. This ultrasound is to reevaluate fetal growth. Her prior ultrasound showed a normally grown baby in the 80th percentile, however abdominal circumference is measuring greater than the 99th percentile. This certainly is of concern given the prior shoulder dystocia and the body habitus of a baby with a large AC, which I explained today as well.  She was not ready today to schedule a primary  section. Plan as of the end of this appointment is to follow-up after the ultrasound is complete to allow for scheduling of  section if she desires.    Barbara Castañeda MD, FACOG  (she/her/hers)    Department of Ob/Gyn/Women's Health  [facility]  [facility]  [address] [place] [address]. [place] [address]  [email or web address]  p. [phone]  f. [phone]

## 2020-11-03 NOTE — TELEPHONE ENCOUNTER
Spoke with Ginger who reports she would like to consult with MD about  due to history of shoulder dystocia. Reports this was advised by midwife but she has been unsure about it until now.    Scheduled her for appointment today with Dr. Castañeda.    She wanted to keep midwife appointment scheduled for tomorrow as well.

## 2020-11-04 ENCOUNTER — TELEPHONE (OUTPATIENT)
Dept: OBGYN | Facility: CLINIC | Age: 30
End: 2020-11-04

## 2020-11-04 DIAGNOSIS — O09.93 HRP (HIGH RISK PREGNANCY), THIRD TRIMESTER: Primary | ICD-10-CM

## 2020-11-04 DIAGNOSIS — O09.93 HRP (HIGH RISK PREGNANCY), THIRD TRIMESTER: ICD-10-CM

## 2020-11-04 DIAGNOSIS — Z11.59 ENCOUNTER FOR SCREENING FOR OTHER VIRAL DISEASES: ICD-10-CM

## 2020-11-04 LAB
ABO + RH BLD: NORMAL
ABO + RH BLD: NORMAL
BASOPHILS # BLD AUTO: 0 10E9/L (ref 0–0.2)
BASOPHILS NFR BLD AUTO: 0.1 %
BLD GP AB SCN SERPL QL: NORMAL
BLOOD BANK CMNT PATIENT-IMP: NORMAL
BLOOD BANK CMNT PATIENT-IMP: NORMAL
DIFFERENTIAL METHOD BLD: NORMAL
EOSINOPHIL # BLD AUTO: 0.1 10E9/L (ref 0–0.7)
EOSINOPHIL NFR BLD AUTO: 0.8 %
ERYTHROCYTE [DISTWIDTH] IN BLOOD BY AUTOMATED COUNT: 13.7 % (ref 10–15)
HCT VFR BLD AUTO: 36.7 % (ref 35–47)
HGB BLD-MCNC: 12.4 G/DL (ref 11.7–15.7)
LYMPHOCYTES # BLD AUTO: 1.4 10E9/L (ref 0.8–5.3)
LYMPHOCYTES NFR BLD AUTO: 16.1 %
MCH RBC QN AUTO: 31.3 PG (ref 26.5–33)
MCHC RBC AUTO-ENTMCNC: 33.8 G/DL (ref 31.5–36.5)
MCV RBC AUTO: 93 FL (ref 78–100)
MONOCYTES # BLD AUTO: 0.7 10E9/L (ref 0–1.3)
MONOCYTES NFR BLD AUTO: 7.8 %
NEUTROPHILS # BLD AUTO: 6.5 10E9/L (ref 1.6–8.3)
NEUTROPHILS NFR BLD AUTO: 75.2 %
PLATELET # BLD AUTO: 204 10E9/L (ref 150–450)
RBC # BLD AUTO: 3.96 10E12/L (ref 3.8–5.2)
SPECIMEN EXP DATE BLD: NORMAL
WBC # BLD AUTO: 8.7 10E9/L (ref 4–11)

## 2020-11-04 PROCEDURE — 36415 COLL VENOUS BLD VENIPUNCTURE: CPT | Performed by: OBSTETRICS & GYNECOLOGY

## 2020-11-04 PROCEDURE — 86850 RBC ANTIBODY SCREEN: CPT | Performed by: OBSTETRICS & GYNECOLOGY

## 2020-11-04 PROCEDURE — 86901 BLOOD TYPING SEROLOGIC RH(D): CPT | Performed by: OBSTETRICS & GYNECOLOGY

## 2020-11-04 PROCEDURE — 86900 BLOOD TYPING SEROLOGIC ABO: CPT | Performed by: OBSTETRICS & GYNECOLOGY

## 2020-11-04 PROCEDURE — 99000 SPECIMEN HANDLING OFFICE-LAB: CPT | Performed by: OBSTETRICS & GYNECOLOGY

## 2020-11-04 PROCEDURE — U0003 INFECTIOUS AGENT DETECTION BY NUCLEIC ACID (DNA OR RNA); SEVERE ACUTE RESPIRATORY SYNDROME CORONAVIRUS 2 (SARS-COV-2) (CORONAVIRUS DISEASE [COVID-19]), AMPLIFIED PROBE TECHNIQUE, MAKING USE OF HIGH THROUGHPUT TECHNOLOGIES AS DESCRIBED BY CMS-2020-01-R: HCPCS | Performed by: OBSTETRICS & GYNECOLOGY

## 2020-11-04 PROCEDURE — 86780 TREPONEMA PALLIDUM: CPT | Mod: 90 | Performed by: OBSTETRICS & GYNECOLOGY

## 2020-11-04 PROCEDURE — 85025 COMPLETE CBC W/AUTO DIFF WBC: CPT | Performed by: OBSTETRICS & GYNECOLOGY

## 2020-11-04 NOTE — TELEPHONE ENCOUNTER
Met with Ana in the clinic briefly at her request while she was receiving her preoperative instructions from our nursing team. Questions regarding her  answered.    Carissa Carballo MD, MSCI    [facility]/OB[facility]N\

## 2020-11-04 NOTE — TELEPHONE ENCOUNTER
Pt in clinic to speak with nurse about upcoming . Provided letter, documents, gatorade, cleansing soap.  Reviewed letter and all documents and instructions.  Discussed that if labor starts prior to scheduled  to call either clinic or on-call provider (if after hours) and provided info on how to do that.  Answered questions to patient satisfaction.  Dr. Carballo went in to meet patient and answer questions.    Of note, labs and covid test were done at 7th floor [facility] lab.

## 2020-11-04 NOTE — TELEPHONE ENCOUNTER
PT was in clinic today and spoke with Brittany JIMENEZ, please see that note. ----- Message from Ami Ruiz sent at 2020  8:18 AM CST -----  Regarding:  question  Contact: [phone]  Pt is calling, unaware she had a  scheduled for - called  to speak to daria, and she is not in today, was told to send inbasket to RN pool, she needs to know all the information for her , prep work, does her  need a covid test? Please call bhavik, thanks!

## 2020-11-05 ENCOUNTER — ANESTHESIA EVENT (OUTPATIENT)
Dept: OBGYN | Facility: CLINIC | Age: 30
End: 2020-11-05
Payer: COMMERCIAL

## 2020-11-05 LAB
LABORATORY COMMENT REPORT: NORMAL
SARS-COV-2 RNA SPEC QL NAA+PROBE: NEGATIVE
SARS-COV-2 RNA SPEC QL NAA+PROBE: NORMAL
SPECIMEN SOURCE: NORMAL
SPECIMEN SOURCE: NORMAL
T PALLIDUM AB SER QL: NONREACTIVE

## 2020-11-06 ENCOUNTER — ANCILLARY PROCEDURE (OUTPATIENT)
Dept: ULTRASOUND IMAGING | Facility: CLINIC | Age: 30
End: 2020-11-06
Payer: COMMERCIAL

## 2020-11-06 ENCOUNTER — HOSPITAL ENCOUNTER (INPATIENT)
Facility: CLINIC | Age: 30
LOS: 3 days | Discharge: HOME | End: 2020-11-09
Attending: OBSTETRICS & GYNECOLOGY | Admitting: OBSTETRICS & GYNECOLOGY
Payer: COMMERCIAL

## 2020-11-06 ENCOUNTER — ANESTHESIA (OUTPATIENT)
Dept: OBGYN | Facility: CLINIC | Age: 30
End: 2020-11-06
Payer: COMMERCIAL

## 2020-11-06 DIAGNOSIS — O09.293 HISTORY OF SHOULDER DYSTOCIA IN PRIOR PREGNANCY, CURRENTLY PREGNANT IN THIRD TRIMESTER: ICD-10-CM

## 2020-11-06 DIAGNOSIS — Z98.891 S/P CESAREAN SECTION: Primary | ICD-10-CM

## 2020-11-06 LAB
ABO + RH BLD: NORMAL
ABO + RH BLD: NORMAL
BASOPHILS # BLD AUTO: 0 10E9/L (ref 0–0.2)
BASOPHILS NFR BLD AUTO: 0.2 %
BLD GP AB SCN SERPL QL: NORMAL
BLOOD BANK CMNT PATIENT-IMP: NORMAL
DIFFERENTIAL METHOD BLD: NORMAL
EOSINOPHIL # BLD AUTO: 0.1 10E9/L (ref 0–0.7)
EOSINOPHIL NFR BLD AUTO: 0.7 %
ERYTHROCYTE [DISTWIDTH] IN BLOOD BY AUTOMATED COUNT: 13.3 % (ref 10–15)
HCT VFR BLD AUTO: 36.2 % (ref 35–47)
HGB BLD-MCNC: 12 G/DL (ref 11.7–15.7)
IMM GRANULOCYTES # BLD: 0.1 10E9/L (ref 0–0.4)
IMM GRANULOCYTES NFR BLD: 0.7 %
LYMPHOCYTES # BLD AUTO: 1.4 10E9/L (ref 0.8–5.3)
LYMPHOCYTES NFR BLD AUTO: 16.7 %
MCH RBC QN AUTO: 31 PG (ref 26.5–33)
MCHC RBC AUTO-ENTMCNC: 33.1 G/DL (ref 31.5–36.5)
MCV RBC AUTO: 94 FL (ref 78–100)
MONOCYTES # BLD AUTO: 0.8 10E9/L (ref 0–1.3)
MONOCYTES NFR BLD AUTO: 8.7 %
NEUTROPHILS # BLD AUTO: 6.3 10E9/L (ref 1.6–8.3)
NEUTROPHILS NFR BLD AUTO: 73 %
NRBC # BLD AUTO: 0 10*3/UL
NRBC BLD AUTO-RTO: 0 /100
PLATELET # BLD AUTO: 191 10E9/L (ref 150–450)
RBC # BLD AUTO: 3.87 10E12/L (ref 3.8–5.2)
SPECIMEN EXP DATE BLD: NORMAL
T PALLIDUM AB SER QL: NONREACTIVE
WBC # BLD AUTO: 8.6 10E9/L (ref 4–11)

## 2020-11-06 PROCEDURE — 86780 TREPONEMA PALLIDUM: CPT | Performed by: OBSTETRICS & GYNECOLOGY

## 2020-11-06 PROCEDURE — 86850 RBC ANTIBODY SCREEN: CPT | Performed by: OBSTETRICS & GYNECOLOGY

## 2020-11-06 PROCEDURE — 999N000139 HC STATISTIC PRE-PROCEDURE ASSESSMENT II: Performed by: OBSTETRICS & GYNECOLOGY

## 2020-11-06 PROCEDURE — 258N000003 HC RX IP 258 OP 636: Performed by: OBSTETRICS & GYNECOLOGY

## 2020-11-06 PROCEDURE — 258N000003 HC RX IP 258 OP 636: Performed by: STUDENT IN AN ORGANIZED HEALTH CARE EDUCATION/TRAINING PROGRAM

## 2020-11-06 PROCEDURE — 85025 COMPLETE CBC W/AUTO DIFF WBC: CPT | Performed by: OBSTETRICS & GYNECOLOGY

## 2020-11-06 PROCEDURE — 272N000001 HC OR GENERAL SUPPLY STERILE: Performed by: OBSTETRICS & GYNECOLOGY

## 2020-11-06 PROCEDURE — C9290 INJ, BUPIVACAINE LIPOSOME: HCPCS | Performed by: STUDENT IN AN ORGANIZED HEALTH CARE EDUCATION/TRAINING PROGRAM

## 2020-11-06 PROCEDURE — 250N000009 HC RX 250: Performed by: STUDENT IN AN ORGANIZED HEALTH CARE EDUCATION/TRAINING PROGRAM

## 2020-11-06 PROCEDURE — 36415 COLL VENOUS BLD VENIPUNCTURE: CPT | Performed by: OBSTETRICS & GYNECOLOGY

## 2020-11-06 PROCEDURE — 250N000011 HC RX IP 250 OP 636: Performed by: OBSTETRICS & GYNECOLOGY

## 2020-11-06 PROCEDURE — 86900 BLOOD TYPING SEROLOGIC ABO: CPT | Performed by: OBSTETRICS & GYNECOLOGY

## 2020-11-06 PROCEDURE — 250N000011 HC RX IP 250 OP 636: Performed by: STUDENT IN AN ORGANIZED HEALTH CARE EDUCATION/TRAINING PROGRAM

## 2020-11-06 PROCEDURE — 250N000013 HC RX MED GY IP 250 OP 250 PS 637: Performed by: OBSTETRICS & GYNECOLOGY

## 2020-11-06 PROCEDURE — 370N000002 HC ANESTHESIA TECHNICAL FEE, EACH ADDTL 15 MIN: Performed by: OBSTETRICS & GYNECOLOGY

## 2020-11-06 PROCEDURE — 370N000001 HC ANESTHESIA TECHNICAL FEE, 1ST 30 MIN: Performed by: OBSTETRICS & GYNECOLOGY

## 2020-11-06 PROCEDURE — 59400 OBSTETRICAL CARE: CPT | Mod: GC | Performed by: OBSTETRICS & GYNECOLOGY

## 2020-11-06 PROCEDURE — 86901 BLOOD TYPING SEROLOGIC RH(D): CPT | Performed by: OBSTETRICS & GYNECOLOGY

## 2020-11-06 PROCEDURE — 360N000022 HC SURGERY LEVEL 3 1ST 30 MIN - UMMC: Performed by: OBSTETRICS & GYNECOLOGY

## 2020-11-06 PROCEDURE — 360N000023 HC SURGERY LEVEL 3 EA 15 ADDTL MIN UMMC: Performed by: OBSTETRICS & GYNECOLOGY

## 2020-11-06 PROCEDURE — 250N000013 HC RX MED GY IP 250 OP 250 PS 637: Performed by: STUDENT IN AN ORGANIZED HEALTH CARE EDUCATION/TRAINING PROGRAM

## 2020-11-06 PROCEDURE — 761N000003 HC RECOVERY PHASE 1 LEVEL 2 FIRST HR: Performed by: OBSTETRICS & GYNECOLOGY

## 2020-11-06 PROCEDURE — 120N000002 HC R&B MED SURG/OB UMMC

## 2020-11-06 PROCEDURE — 761N000004 HC RECOVERY PHASE 1 LEVEL 2 EA ADDTL HR: Performed by: OBSTETRICS & GYNECOLOGY

## 2020-11-06 PROCEDURE — 271N000001 HC OR GENERAL SUPPLY NON-STERILE: Performed by: OBSTETRICS & GYNECOLOGY

## 2020-11-06 RX ORDER — LIDOCAINE 40 MG/G
CREAM TOPICAL
Status: DISCONTINUED | OUTPATIENT
Start: 2020-11-06 | End: 2020-11-06

## 2020-11-06 RX ORDER — OXYTOCIN/0.9 % SODIUM CHLORIDE 30/500 ML
340 PLASTIC BAG, INJECTION (ML) INTRAVENOUS CONTINUOUS PRN
Status: DISCONTINUED | OUTPATIENT
Start: 2020-11-06 | End: 2020-11-09 | Stop reason: HOSPADM

## 2020-11-06 RX ORDER — IBUPROFEN 800 MG/1
800 TABLET, FILM COATED ORAL EVERY 6 HOURS
Status: DISCONTINUED | OUTPATIENT
Start: 2020-11-07 | End: 2020-11-09 | Stop reason: HOSPADM

## 2020-11-06 RX ORDER — ACETAMINOPHEN 325 MG/1
650 TABLET ORAL EVERY 6 HOURS PRN
Qty: 100 TABLET | Refills: 0 | Status: SHIPPED | OUTPATIENT
Start: 2020-11-06

## 2020-11-06 RX ORDER — CARBOPROST TROMETHAMINE 250 UG/ML
250 INJECTION, SOLUTION INTRAMUSCULAR
Status: DISCONTINUED | OUTPATIENT
Start: 2020-11-06 | End: 2020-11-09 | Stop reason: HOSPADM

## 2020-11-06 RX ORDER — HYDROCORTISONE 2.5 %
CREAM (GRAM) TOPICAL 3 TIMES DAILY PRN
Status: DISCONTINUED | OUTPATIENT
Start: 2020-11-06 | End: 2020-11-09 | Stop reason: HOSPADM

## 2020-11-06 RX ORDER — CEFAZOLIN SODIUM 1 G/3ML
1 INJECTION, POWDER, FOR SOLUTION INTRAMUSCULAR; INTRAVENOUS SEE ADMIN INSTRUCTIONS
Status: DISCONTINUED | OUTPATIENT
Start: 2020-11-06 | End: 2020-11-06

## 2020-11-06 RX ORDER — MORPHINE SULFATE 1 MG/ML
INJECTION, SOLUTION EPIDURAL; INTRATHECAL; INTRAVENOUS PRN
Status: DISCONTINUED | OUTPATIENT
Start: 2020-11-06 | End: 2020-11-06

## 2020-11-06 RX ORDER — BISACODYL 10 MG
10 SUPPOSITORY, RECTAL RECTAL DAILY PRN
Status: DISCONTINUED | OUTPATIENT
Start: 2020-11-08 | End: 2020-11-09 | Stop reason: HOSPADM

## 2020-11-06 RX ORDER — AMOXICILLIN 250 MG
2 CAPSULE ORAL 2 TIMES DAILY
Status: DISCONTINUED | OUTPATIENT
Start: 2020-11-06 | End: 2020-11-09 | Stop reason: HOSPADM

## 2020-11-06 RX ORDER — DEXTROSE, SODIUM CHLORIDE, SODIUM LACTATE, POTASSIUM CHLORIDE, AND CALCIUM CHLORIDE 5; .6; .31; .03; .02 G/100ML; G/100ML; G/100ML; G/100ML; G/100ML
INJECTION, SOLUTION INTRAVENOUS CONTINUOUS
Status: DISCONTINUED | OUTPATIENT
Start: 2020-11-06 | End: 2020-11-09 | Stop reason: HOSPADM

## 2020-11-06 RX ORDER — FENTANYL CITRATE-0.9 % NACL/PF 10 MCG/ML
PLASTIC BAG, INJECTION (ML) INTRAVENOUS CONTINUOUS PRN
Status: DISCONTINUED | OUTPATIENT
Start: 2020-11-06 | End: 2020-11-06

## 2020-11-06 RX ORDER — AMOXICILLIN 250 MG
1 CAPSULE ORAL 2 TIMES DAILY
Status: DISCONTINUED | OUTPATIENT
Start: 2020-11-06 | End: 2020-11-09 | Stop reason: HOSPADM

## 2020-11-06 RX ORDER — ONDANSETRON 2 MG/ML
INJECTION INTRAMUSCULAR; INTRAVENOUS PRN
Status: DISCONTINUED | OUTPATIENT
Start: 2020-11-06 | End: 2020-11-06

## 2020-11-06 RX ORDER — OXYTOCIN/0.9 % SODIUM CHLORIDE 30/500 ML
100 PLASTIC BAG, INJECTION (ML) INTRAVENOUS CONTINUOUS
Status: DISCONTINUED | OUTPATIENT
Start: 2020-11-06 | End: 2020-11-09 | Stop reason: HOSPADM

## 2020-11-06 RX ORDER — METHYLERGONOVINE MALEATE 0.2 MG/ML
200 INJECTION INTRAVENOUS
Status: DISCONTINUED | OUTPATIENT
Start: 2020-11-06 | End: 2020-11-09 | Stop reason: HOSPADM

## 2020-11-06 RX ORDER — ACETAMINOPHEN 325 MG/1
975 TABLET ORAL ONCE
Status: COMPLETED | OUTPATIENT
Start: 2020-11-06 | End: 2020-11-06

## 2020-11-06 RX ORDER — ACETAMINOPHEN 325 MG/1
975 TABLET ORAL EVERY 6 HOURS
Status: DISCONTINUED | OUTPATIENT
Start: 2020-11-06 | End: 2020-11-09 | Stop reason: HOSPADM

## 2020-11-06 RX ORDER — KETOROLAC TROMETHAMINE 30 MG/ML
30 INJECTION, SOLUTION INTRAMUSCULAR; INTRAVENOUS EVERY 6 HOURS
Status: COMPLETED | OUTPATIENT
Start: 2020-11-06 | End: 2020-11-07

## 2020-11-06 RX ORDER — CEFAZOLIN SODIUM 2 G/100ML
2 INJECTION, SOLUTION INTRAVENOUS
Status: COMPLETED | OUTPATIENT
Start: 2020-11-06 | End: 2020-11-06

## 2020-11-06 RX ORDER — CITRIC ACID/SODIUM CITRATE 334-500MG
SOLUTION, ORAL ORAL
Status: DISCONTINUED
Start: 2020-11-06 | End: 2020-11-06 | Stop reason: HOSPADM

## 2020-11-06 RX ORDER — SIMETHICONE 80 MG
80 TABLET,CHEWABLE ORAL 4 TIMES DAILY PRN
Status: DISCONTINUED | OUTPATIENT
Start: 2020-11-06 | End: 2020-11-09 | Stop reason: HOSPADM

## 2020-11-06 RX ORDER — NALOXONE HYDROCHLORIDE 0.4 MG/ML
.1-.4 INJECTION, SOLUTION INTRAMUSCULAR; INTRAVENOUS; SUBCUTANEOUS
Status: DISCONTINUED | OUTPATIENT
Start: 2020-11-06 | End: 2020-11-09 | Stop reason: HOSPADM

## 2020-11-06 RX ORDER — MODIFIED LANOLIN
OINTMENT (GRAM) TOPICAL
Status: DISCONTINUED | OUTPATIENT
Start: 2020-11-06 | End: 2020-11-09 | Stop reason: HOSPADM

## 2020-11-06 RX ORDER — BUPIVACAINE HYDROCHLORIDE 7.5 MG/ML
INJECTION, SOLUTION INTRASPINAL PRN
Status: DISCONTINUED | OUTPATIENT
Start: 2020-11-06 | End: 2020-11-06

## 2020-11-06 RX ORDER — SODIUM CHLORIDE, SODIUM LACTATE, POTASSIUM CHLORIDE, CALCIUM CHLORIDE 600; 310; 30; 20 MG/100ML; MG/100ML; MG/100ML; MG/100ML
INJECTION, SOLUTION INTRAVENOUS CONTINUOUS
Status: DISCONTINUED | OUTPATIENT
Start: 2020-11-06 | End: 2020-11-06

## 2020-11-06 RX ORDER — IBUPROFEN 600 MG/1
600 TABLET, FILM COATED ORAL EVERY 6 HOURS PRN
Qty: 60 TABLET | Refills: 0 | Status: SHIPPED | OUTPATIENT
Start: 2020-11-06

## 2020-11-06 RX ORDER — CITRIC ACID/SODIUM CITRATE 334-500MG
30 SOLUTION, ORAL ORAL
Status: DISCONTINUED | OUTPATIENT
Start: 2020-11-06 | End: 2020-11-06

## 2020-11-06 RX ORDER — ONDANSETRON 2 MG/ML
4 INJECTION INTRAMUSCULAR; INTRAVENOUS EVERY 6 HOURS PRN
Status: DISCONTINUED | OUTPATIENT
Start: 2020-11-06 | End: 2020-11-09 | Stop reason: HOSPADM

## 2020-11-06 RX ORDER — LIDOCAINE 40 MG/G
CREAM TOPICAL
Status: DISCONTINUED | OUTPATIENT
Start: 2020-11-06 | End: 2020-11-09 | Stop reason: HOSPADM

## 2020-11-06 RX ORDER — OXYCODONE HYDROCHLORIDE 5 MG/1
5 TABLET ORAL EVERY 4 HOURS PRN
Status: DISCONTINUED | OUTPATIENT
Start: 2020-11-06 | End: 2020-11-09 | Stop reason: HOSPADM

## 2020-11-06 RX ORDER — FENTANYL CITRATE 50 UG/ML
INJECTION, SOLUTION INTRAMUSCULAR; INTRAVENOUS PRN
Status: DISCONTINUED | OUTPATIENT
Start: 2020-11-06 | End: 2020-11-06

## 2020-11-06 RX ORDER — OXYTOCIN 10 [USP'U]/ML
10 INJECTION, SOLUTION INTRAMUSCULAR; INTRAVENOUS
Status: DISCONTINUED | OUTPATIENT
Start: 2020-11-06 | End: 2020-11-09 | Stop reason: HOSPADM

## 2020-11-06 RX ORDER — BUPIVACAINE HYDROCHLORIDE 2.5 MG/ML
INJECTION, SOLUTION EPIDURAL; INFILTRATION; INTRACAUDAL PRN
Status: DISCONTINUED | OUTPATIENT
Start: 2020-11-06 | End: 2020-11-06

## 2020-11-06 RX ORDER — MISOPROSTOL 200 UG/1
800 TABLET ORAL
Status: DISCONTINUED | OUTPATIENT
Start: 2020-11-06 | End: 2020-11-09 | Stop reason: HOSPADM

## 2020-11-06 RX ORDER — OXYTOCIN/0.9 % SODIUM CHLORIDE 30/500 ML
PLASTIC BAG, INJECTION (ML) INTRAVENOUS CONTINUOUS PRN
Status: DISCONTINUED | OUTPATIENT
Start: 2020-11-06 | End: 2020-11-06

## 2020-11-06 RX ORDER — AMOXICILLIN 250 MG
1 CAPSULE ORAL DAILY
Qty: 100 TABLET | Refills: 0 | Status: SHIPPED | OUTPATIENT
Start: 2020-11-06 | End: 2020-11-25

## 2020-11-06 RX ADMIN — SODIUM CHLORIDE, SODIUM LACTATE, POTASSIUM CHLORIDE, CALCIUM CHLORIDE AND DEXTROSE MONOHYDRATE: 5; 600; 310; 30; 20 INJECTION, SOLUTION INTRAVENOUS at 15:48

## 2020-11-06 RX ADMIN — Medication 50 MCG/MIN: at 08:54

## 2020-11-06 RX ADMIN — BUPIVACAINE HYDROCHLORIDE IN DEXTROSE 1.6 ML: 7.5 INJECTION, SOLUTION SUBARACHNOID at 08:54

## 2020-11-06 RX ADMIN — SIMETHICONE 80 MG: 80 TABLET, CHEWABLE ORAL at 13:39

## 2020-11-06 RX ADMIN — DOCUSATE SODIUM AND SENNOSIDES 1 TABLET: 8.6; 5 TABLET ORAL at 19:37

## 2020-11-06 RX ADMIN — ONDANSETRON 4 MG: 2 INJECTION INTRAMUSCULAR; INTRAVENOUS at 09:12

## 2020-11-06 RX ADMIN — Medication 2 G: at 08:57

## 2020-11-06 RX ADMIN — SODIUM CHLORIDE, POTASSIUM CHLORIDE, SODIUM LACTATE AND CALCIUM CHLORIDE: 600; 310; 30; 20 INJECTION, SOLUTION INTRAVENOUS at 08:39

## 2020-11-06 RX ADMIN — FENTANYL CITRATE 15 MCG: 50 INJECTION, SOLUTION INTRAMUSCULAR; INTRAVENOUS at 08:54

## 2020-11-06 RX ADMIN — KETOROLAC TROMETHAMINE 30 MG: 30 INJECTION, SOLUTION INTRAMUSCULAR at 15:51

## 2020-11-06 RX ADMIN — BUPIVACAINE HYDROCHLORIDE 20 ML: 2.5 INJECTION, SOLUTION EPIDURAL; INFILTRATION; INTRACAUDAL at 09:48

## 2020-11-06 RX ADMIN — KETOROLAC TROMETHAMINE 30 MG: 30 INJECTION, SOLUTION INTRAMUSCULAR at 22:17

## 2020-11-06 RX ADMIN — PHENYLEPHRINE HYDROCHLORIDE 100 MCG: 10 INJECTION INTRAVENOUS at 08:58

## 2020-11-06 RX ADMIN — OXYTOCIN-SODIUM CHLORIDE 0.9% IV SOLN 30 UNIT/500ML 300 ML/HR: 30-0.9/5 SOLUTION at 09:12

## 2020-11-06 RX ADMIN — MORPHINE SULFATE 0.15 MG: 1 INJECTION EPIDURAL; INTRATHECAL; INTRAVENOUS at 08:54

## 2020-11-06 RX ADMIN — ACETAMINOPHEN 975 MG: 325 TABLET, FILM COATED ORAL at 19:37

## 2020-11-06 RX ADMIN — ACETAMINOPHEN 975 MG: 325 TABLET, FILM COATED ORAL at 08:14

## 2020-11-06 RX ADMIN — SODIUM CHLORIDE, POTASSIUM CHLORIDE, SODIUM LACTATE AND CALCIUM CHLORIDE: 600; 310; 30; 20 INJECTION, SOLUTION INTRAVENOUS at 08:14

## 2020-11-06 RX ADMIN — Medication 100 ML/HR: at 11:04

## 2020-11-06 RX ADMIN — SIMETHICONE 80 MG: 80 TABLET, CHEWABLE ORAL at 19:37

## 2020-11-06 RX ADMIN — ACETAMINOPHEN 975 MG: 325 TABLET, FILM COATED ORAL at 13:58

## 2020-11-06 RX ADMIN — BUPIVACAINE 20 ML: 13.3 INJECTION, SUSPENSION, LIPOSOMAL INFILTRATION at 09:48

## 2020-11-06 ASSESSMENT — ACTIVITIES OF DAILY LIVING (ADL)
TOILETING_ISSUES: NO
FALL_HISTORY_WITHIN_LAST_SIX_MONTHS: NO

## 2020-11-06 NOTE — PLAN OF CARE
Data: Leticia Ngo transferred to [id number] via cart at 1300. Baby in NICU.  Action: Receiving unit notified of transfer: Yes. Patient and family notified of room change. Report given to Rachel JIMENEZ at 1200. Belongings sent to receiving unit. Accompanied by Registered Nurse. Oriented patient to surroundings. Call light within reach. Response: Patient tolerated transfer and is stable.

## 2020-11-06 NOTE — ANESTHESIA CARE TRANSFER NOTE
Patient: Leticia Ngo    Procedure(s):  PRIMARY  SECTION    Diagnosis: History of shoulder dystocia in prior pregnancy, currently pregnant in third trimester [O09.293]  Diagnosis Additional Information: No value filed.    Anesthesia Type:   MAC, Spinal, Peripheral Nerve Block, For Post-op pain in coordination with surgeon     Note:  Airway :Room Air  Patient transferred to:PACU  Comments: VSS. Breathing spontaneously at a regular rate with adequate tidal volumes and maintaining O2 sats on room air. Denies nausea or pain. No apparent complications from anesthesia.     Mick John MD  Anesthesiology, CA-1Handoff Report: Identifed the Patient, Identified the Reponsible Provider, Reviewed the pertinent medical history, Discussed the surgical course, Reviewed Intra-OP anesthesia mangement and issues during anesthesia, Set expectations for post-procedure period and Allowed opportunity for questions and acknowledgement of understanding      Vitals: (Last set prior to Anesthesia Care Transfer)    CRNA VITALS  2020 0925 - 2020 1024      2020             Pulse:  95    Ht Rate:  96    SpO2:  99 %                Electronically Signed By: Mick John MD  2020  10:24 AM

## 2020-11-06 NOTE — PROVIDER NOTIFICATION
11/06/20 1446   Provider Notification   Provider Name/Title Dr. Lucero   Method of Notification Electronic Page   Request Evaluate-Remote   Notification Reason Vital Signs Change   S.H. 0830 c/s, had a couple elevated pressures, due to changing position. The 134/91, patient was upset and crying. Thanks, Rachel [id number]

## 2020-11-06 NOTE — DISCHARGE SUMMARY
[facility] Discharge Summary    Leticia Ngo MRN# [id number]   Age: 30 year old YOB: 1990     Date of Admission:  2020  Date of Discharge::  20  Admitting Physician:  Carissa Carballo MD  Discharge Physician:  Carissa Carballo MD             Admission Diagnoses:   Intrauterine pregnancy at 39w1d  History of shoulder dystocia          Discharge Diagnosis:     Same, delivered           Procedures:     Procedure(s): Primary low transverse  section with two layer closure via Pfannenstiel skin incision  Spinal anesthesia  TAP block                Medications Prior to Admission:     Medications Prior to Admission   Medication Sig Dispense Refill Last Dose     miconazole (MICATIN) 2 % cream    Past Month at Unknown time     omega-3 acid ethyl esters (LOVAZA) 1 g capsule Take 2 g by mouth 2 times daily   2020 at Unknown time     Prenatal Vit-Fe Fumarate-FA (PRENATAL MULTIVITAMIN W/IRON) 27-0.8 MG tablet Take 1 tablet by mouth daily   2020 at Unknown time     lactobacillus rhamnosus, GG, (CULTURELL) capsule Take 1 capsule by mouth 2 times daily   More than a month at Unknown time             Discharge Medications:        Review of your medicines      START taking      Dose / Directions   acetaminophen 325 MG tablet  Commonly known as: TYLENOL  Used for: S/P  section      Dose: 650 mg  Take 2 tablets (650 mg) by mouth every 6 hours as needed for mild pain Start after Delivery.  Quantity: 100 tablet  Refills: 0     ibuprofen 600 MG tablet  Commonly known as: ADVIL/MOTRIN  Used for: S/P  section      Dose: 600 mg  Take 1 tablet (600 mg) by mouth every 6 hours as needed for moderate pain Start after delivery  Quantity: 60 tablet  Refills: 0     oxyCODONE 5 MG tablet  Commonly known as: ROXICODONE  Used for: S/P  section      Dose: 5 mg  Take 1 tablet (5 mg) by mouth every 6 hours as needed for severe pain  Quantity: 4 tablet  Refills: 0      senna-docusate 8.6-50 MG tablet  Commonly known as: SENOKOT-S/PERICOLACE  Used for: S/P  section      Dose: 1 tablet  Take 1 tablet by mouth daily Start after delivery.  Quantity: 100 tablet  Refills: 0        CONTINUE these medicines which have NOT CHANGED      Dose / Directions   lactobacillus rhamnosus (GG) capsule      Dose: 1 capsule  Take 1 capsule by mouth 2 times daily  Refills: 0     miconazole 2 % cream  Commonly known as: MICATIN      Refills: 0     omega-3 acid ethyl esters 1 g capsule  Commonly known as: LOVAZA      Dose: 2 g  Take 2 g by mouth 2 times daily  Refills: 0     prenatal multivitamin w/iron 27-0.8 MG tablet      Dose: 1 tablet  Take 1 tablet by mouth daily  Refills: 0           Where to get your medicines      These medications were sent to [facility] [place], [place] - [address]  [address] [address]    Phone: [phone]     acetaminophen 325 MG tablet    ibuprofen 600 MG tablet    senna-docusate 8.6-50 MG tablet     Some of these will need a paper prescription and others can be bought over the counter. Ask your nurse if you have questions.    Bring a paper prescription for each of these medications    oxyCODONE 5 MG tablet               Consultations:   Anesthesia          Brief Admission History:   Ms. Leticia Ngo is a 30 year old  at 39w1d who presents for scheduled  section.  She denies contractions, vaginal bleeding, and loss of fluid.   + normal fetal movement.     Intraoperative course   The procedure was uncomplicated.   mL.  See operative report for details.     Findings:   1. No adhesions  2. Clear amniotic fluid  3. Liveborn male infant in cephalic presentation. Apgars 8 at 1 minute & 6 at 5 minutes. Weight pending.  4. Normal uterus, fallopian tubes, and ovaries.        Postpartum Course   The patient's hospital course was unremarkable.  She recovered as anticipated and experienced no  post-operative complications. On discharge, her pain was well controlled. Vaginal bleeding is similar to peak menstrual flow.  Voiding without difficulty.  Ambulating well and tolerating a normal diet.  No fever or significant wound drainage.  Breastfeeding well.  Infant is stable.  Has had return of bowel function  She was discharged on post-partum day #3.    Post-partum hemoglobin:   Hemoglobin   Date Value Ref Range Status   11/07/2020 11.5 (L) 11.7 - 15.7 g/dL Final             Discharge Instructions and Follow-Up:     Discharge diet: Regular   Discharge activity: No lifting greater than 20 lbs, pushing, pulling, or other strenuous activity for 6 weeks. Pelvic rest for 6 weeks including no sexual intercourse, tampons, or douching. No driving until you can slam on the breaks without pain or while on narcotic pain medications.    Discharge follow-up: Follow up with primary OB for routine postpartum visit in 6 weeks   Wound care: Keep incision clean and dry           Discharge Disposition:     Discharged to home        Magda Lucero MD  Obstetrics and Gynecology, PGY-3  11/9/2020 6:43 AM

## 2020-11-06 NOTE — PLAN OF CARE
Data: Patient presented to [facility] at 0650.   Reason for maternal/fetal assessment per patient is No chief complaint on file.  .  Patient is a . Prenatal record reviewed.      OB History    Para Term  AB Living   3 1 1 0 1 1   SAB TAB Ectopic Multiple Live Births   0 0 0 0 1      # Outcome Date GA Lbr Elder/2nd Weight Sex Delivery Anes PTL Lv   3 Current            2 AB 2019 7w0d    SAB      1 Term 19 38w4d  3.629 kg (8 lb) F  Nitrous N KATIE      Birth Comments: shoulder dystocia for about 110 sec, resolved with brad and suprapubic pressure       Complications: Shoulder Dystocia      Name: Francia     . Medical history:   Past Medical History:   Diagnosis Date    Allergic rhinitis, cause unspecified     Uncomplicated asthma    . Gestational Age 39w1d. VSS. Fetal movement present. Patient denies cramping, backache, vaginal discharge, pelvic pressure, UTI symptoms, GI problems, bloody show, vaginal bleeding, edema, headache, visual disturbances, epigastric or URQ pain, abdominal pain, rupture of membranes. Ginger is here for scheduled C/S. Support persons are present, her  Miki.  Action: Verbal consent for EFM. Triage assessment completed. EFM applied for NST . Uterine assessment shows no contractions.. Fetal assessment: Presumed adequate fetal oxygenation documented (see flow record). Consents for surgery, blood products and TAPS block signed and witnessed.   Response: Dr. Carballo informed of arrival. Plan per provider is proceed to OR as planned. Patient verbalized agreement with plan.

## 2020-11-06 NOTE — ANESTHESIA PROCEDURE NOTES
Spinal/LP Procedure Note    Spinal Block      Staff -   Anesthesiologist:  Todd Contreras MD  Resident/Fellow: Mick John MD  Performed By: resident  Location: OB and OR  Procedure Start/Stop Times:      11/6/2020 8:45 AM     11/6/2020 8:54 AM    patient identified, IV checked, site marked, risks and benefits discussed, informed consent, monitors and equipment checked, pre-op evaluation and at physician/surgeon's request      Correct Patient: Yes      Correct Position: Yes      Correct Site: Yes      Correct Procedure: Yes      Correct Laterality:  Yes    Site Marked:  N/A  Procedure:     Procedure:  Intrathecal    ASA:  2    Diagnosis:  C section    Position:  Sitting    Sterile Prep: chloraprep      Insertion site:  L4-5    Approach:  Midline    Needle Type:  Quincke    Needle gauge (G):  25    Local Skin Infiltration:  1% lidocaine    amount (ml):  3    Needle Length (in):  3.5    Introducer used: Yes      Introducer gauge:  20 G    Attempts:  1    Redirects:  1    CSF:  Clear    Paresthesias:  No

## 2020-11-06 NOTE — ANESTHESIA POSTPROCEDURE EVALUATION
Anesthesia POST Procedure Evaluation    Patient: Leticia Ngo   MRN:     [id number] Gender:   female   Age:    30 year old :      1990        Preoperative Diagnosis: History of shoulder dystocia in prior pregnancy, currently pregnant in third trimester [O09.293]   Procedure(s):  PRIMARY  SECTION   Postop Comments: No value filed.     Anesthesia Type: MAC, Spinal, Peripheral Nerve Block, For Post-op pain in coordination with surgeon          Postop Pain Control: Uneventful            Sign Out: Well controlled pain   PONV: No   Neuro/Psych: Uneventful            Sign Out: Acceptable/Baseline neuro status   Airway/Respiratory: Uneventful            Sign Out: Acceptable/Baseline resp. status   CV/Hemodynamics: Uneventful            Sign Out: Acceptable CV status   Other NRE: NONE   DID A NON-ROUTINE EVENT OCCUR? No         Last Anesthesia Record Vitals:  CRNA VITALS  2020 0925 - 2020 1025      2020             Pulse:  95    Ht Rate:  96    SpO2:  99 %          Last PACU Vitals:  Vitals Value Taken Time   /72 20 1035   Temp     Pulse 92 20 1036   Resp 42 20 1036   SpO2 100 % 20 1036   Temp src     NIBP     Pulse     SpO2     Resp     Temp     Ht Rate     Temp 2     Vitals shown include unvalidated device data.      Electronically Signed By: Samuel James MD, 2020, 10:37 AM

## 2020-11-06 NOTE — PLAN OF CARE
VSS. Pain managed with Tylenol and Toradol. Small clots x2 expressed on first check.  at bedside attentive to patient. Pumping for  in NICU.

## 2020-11-06 NOTE — PROGRESS NOTES
OBGYN Attending Progress Note    Ginger and I discussed the indications for this  (history of prior shoulder dystocia), and the risks of the procedure, including: hemorrhage, infection, DVT/PE, injury to bowel/bladder/ureters/nerves/blood vessels requiring repair/reoperation for repair. Questions about procedure answered, consents signed.    FHT: 135/moderate variability/+accels/no decels  Gackle: irritable    Carissa Carballo MD, MSCI    [facility]

## 2020-11-06 NOTE — ANESTHESIA PREPROCEDURE EVALUATION
Anesthesia Pre-Procedure Evaluation    Patient: Leticia Ngo   MRN:     [id number] Gender:   female   Age:    30 year old :      1990        Preoperative Diagnosis: History of shoulder dystocia in prior pregnancy, currently pregnant in third trimester [O09.293]   Procedure(s):  PRIMARY  SECTION     LABS:  CBC:   Lab Results   Component Value Date    WBC 8.7 2020    WBC 8.8 2020    HGB 12.4 2020    HGB 12.1 2020    HCT 36.7 2020    HCT 37.3 2020     2020     2020     BMP:   Lab Results   Component Value Date     2015     2006    POTASSIUM 3.7 2015    POTASSIUM 3.8 2006    CHLORIDE 102 2015    CHLORIDE 103 2006    CO2 28 2015    CO2 28 2006    BUN 9 2015    BUN 12 2006    CR 0.72 2015    CR 0.80 2006    GLC 81 2015    GLC 85 2006     COAGS: No results found for: PTT, INR, FIBR  POC:   Lab Results   Component Value Date    HCG  2010     Negative   This test provides a presumptive diagnosis of pregnancy or non-pregnancy. A   confirmed pregnancy diagnosis should only be made by a physician after all   clinical and laboratory findings have been evaluated.     OTHER:   Lab Results   Component Value Date    JENNY 9.0 2015    ALBUMIN 4.2 2015    PROTTOTAL 7.5 2015    ALT 18 2015    AST 18 2015    ALKPHOS 51 2015    BILITOTAL 0.4 2015    TSH 0.54 2009    T4 0.70 2009    CRP <2.9 2015    SED 6 2015        Preop Vitals    BP Readings from Last 3 Encounters:   20 132/79   10/28/20 121/80   10/28/20 117/82    Pulse Readings from Last 3 Encounters:   20 102   10/28/20 121   10/28/20 85      Resp Readings from Last 3 Encounters:   10/13/20 18   08/02/10 16   06/01/10 16    SpO2 Readings from Last 3 Encounters:   20 97%   06/30/15 99%   08 99%      Temp Readings from  "Last 1 Encounters:   10/28/20 36.8  C (98.3  F) (Oral)    Ht Readings from Last 1 Encounters:   10/28/20 1.651 m (5' 5\")      Wt Readings from Last 1 Encounters:   11/03/20 79.8 kg (176 lb)    Estimated body mass index is 29.29 kg/m  as calculated from the following:    Height as of 10/28/20: 1.651 m (5' 5\").    Weight as of 11/3/20: 79.8 kg (176 lb).     LDA:        Past Medical History:   Diagnosis Date     Allergic rhinitis, cause unspecified       Past Surgical History:   Procedure Laterality Date     APPENDECTOMY       appendicitis  09/2019     C ORAL SURGERY PROCEDURE      [place] teeth      Allergies   Allergen Reactions     Ciprofloxacin Nausea and Vomiting     Hydrocodone Nausea and Vomiting     Vicodin [Acetaminophen] Nausea and Vomiting        Anesthesia Evaluation     . Pt has had prior anesthetic. Type: General    No history of anesthetic complications          ROS/MED HX    ENT/Pulmonary:  - neg pulmonary ROS     Neurologic:  - neg neurologic ROS     Cardiovascular:  - neg cardiovascular ROS       METS/Exercise Tolerance:     Hematologic:  - neg hematologic  ROS       Musculoskeletal:  - neg musculoskeletal ROS       GI/Hepatic:     (+) GERD       Renal/Genitourinary:  - ROS Renal section negative       Endo:  - neg endo ROS       Psychiatric:         Infectious Disease:  - neg infectious disease ROS       Malignancy:      - no malignancy   Other:    (+) no H/O Chronic Pain,                       PHYSICAL EXAM:   Mental Status/Neuro: A/A/O   Airway: Facies: Feasible  Mallampati: II  Mouth/Opening: Full  TM distance: > 6 cm  Neck ROM: Full   Respiratory: Auscultation: CTAB     Resp. Rate: Normal     Resp. Effort: Normal      CV: Rhythm: Regular  Rate: Age appropriate  Heart: Normal Sounds  Edema: None   Comments:      Dental: Normal Dentition                Assessment:   ASA SCORE: 2       NPO Status: NPO Appropriate     Plan:   Anes. Type:  MAC; Spinal; Peripheral Nerve Block; For Post-op pain in " coordination with surgeon     Block Details: TAP; Exparel; Single Shot   Pre-Medication: None   Induction:  N/a   Airway: Native Airway   Access/Monitoring: PIV   Maintenance: N/a     Postop Plan:   Postop Pain: Regional  Postop Sedation/Airway: Not planned  Disposition: Inpatient/Admit     PONV Management: Adult Risk Factors: Female   Prevention: Ondansetron     CONSENT: Direct conversation   Plan and risks discussed with: Patient   Blood Products: Consented (ALL Blood Products)                   Mick John MD

## 2020-11-06 NOTE — H&P
[facility]  OB History and Physical      Leticia Ngo MRN# [id number]   Age: 30 year old YOB: 1990       HPI:  Ms. Leticia Ngo is a 30 year old  at 39w1d who presents for scheduled  section.  She denies contractions, vaginal bleeding, and loss of fluid.   + normal fetal movement.    Pregnancy Complications:  -  History of 110s shoulder dystocia, no long term fetal injury     Prenatal Labs:   Lab Results   Component Value Date    ABO PENDING 2020    RH Pos 2020    AS PENDING 2020    HEPBANG negative 2020    CHPCRT negative 2020    GCPCRT negative 2020    TREPAB Negative 2009    HGB 12.0 2020    HIV Negative 2009       GBS Status:   Lab Results   Component Value Date    GBS Negative 10/13/2020         OB History  OB History    Para Term  AB Living   3 1 1 0 1 1   SAB TAB Ectopic Multiple Live Births   0 0 0 0 1      # Outcome Date GA Lbr Elder/2nd Weight Sex Delivery Anes PTL Lv   3 Current            2 AB 2019 7w0d    SAB      1 Term 19 38w4d  3.629 kg (8 lb) F  Nitrous N KATIE      Birth Comments: shoulder dystocia for about 110 sec, resolved with brad and suprapubic pressure       Complications: Shoulder Dystocia      Name: Francia         PMHx:   Past Medical History:   Diagnosis Date     Allergic rhinitis, cause unspecified      Uncomplicated asthma      PSHx:   Past Surgical History:   Procedure Laterality Date     APPENDECTOMY       appendicitis  2019     C ORAL SURGERY PROCEDURE      Oneill teeth     Meds:   Medications Prior to Admission   Medication Sig Dispense Refill Last Dose     miconazole (MICATIN) 2 % cream    Past Month at Unknown time     omega-3 acid ethyl esters (LOVAZA) 1 g capsule Take 2 g by mouth 2 times daily   2020 at Unknown time     Prenatal Vit-Fe Fumarate-FA (PRENATAL MULTIVITAMIN W/IRON) 27-0.8 MG tablet Take 1 tablet by mouth daily    2020 at Unknown time     lactobacillus rhamnosus, GG, (CULTURELL) capsule Take 1 capsule by mouth 2 times daily   More than a month at Unknown time     Allergies:    Allergies   Allergen Reactions     Ciprofloxacin Nausea and Vomiting     Hydrocodone Nausea and Vomiting     Vicodin [Acetaminophen] Nausea and Vomiting      FmHx:   Family History   Problem Relation Age of Onset     Other - See Comments Mother         ovarian cysts     Skin Cancer Mother         nose, removed      Lipids Father      Diabetes Father      Mental Illness Father      Personality Disorder Father         borderline personality disorder      Cancer - colorectal Maternal Grandfather      Neurologic Disorder Maternal Grandfather         parkinson     Diabetes Paternal Grandmother      Cancer - colorectal Paternal Grandfather      Diabetes Paternal Grandfather      C.A.D. Paternal Uncle         MI, by pass     C.A.D. Paternal Uncle         MI      SocHx: She denies any tobacco, alcohol, or other drug use during this pregnancy.    ROS:   Complete 10-point ROS negative except as noted in HPI. She denies headache, blurry vision, chest pain, shortness of breath, RUQ pain, nausea, vomiting, dysuria, hematuria or extremity edema.    PE:  Vit:   Patient Vitals for the past 4 hrs:   BP Temp Temp src Resp   20 0700 113/62 -- -- --   20 0658 -- 97.8  F (36.6  C) Oral 18      Gen: Well-appearing, NAD, comfortable   CV: rrr, no mrg   Pulm: Ctab, no wheezes or crackles   Abd: Soft, gravid, non-tender  Ext: trace LE edema b/l        FHT: Baseline 140, mod variability, present accelerations, no decelerations   Carney: 1 contractions in 10 minutes      Assessment  Ms. Leticia Ngo is a 30 year old , at 39w1d by LMP c/w 6w1d US, who presents for scheduled  section.    Plan  Admit to L&D.    #Scheduled Primary  Section  Indicated due to history of prior shoulder dystocia.   - Labs: CBC, T&S, RPR  - Placenta: posterior   -  Anesthesia: Spinal  - 2g Ancef   - Diet: NPO  - PPx: SCDs  - Consent: Discussed risks and benefits of procedure, including but not limited to bleeding, infection, injury to surrounding organs, injury to infant, and the potential need for another surgery should some injury go unrecognized or patient were to have continued bleeding. Patient had time to ask questions and expressed understanding of procedure and associated risks. Agreed to blood transfusion if necessary. Consent signed.     #FWB: Category I FHT.  Continue EFM and toco  - GBS neg    #PNC: Rh pos, Rubella immune, GCT wnl, anatomy wnl      The patient was discussed with Dr. Carballo who is in agreement with the treatment plan.    Magda Lucero MD  OBGYN PGY-3  8:10 AM 11/6/2020

## 2020-11-06 NOTE — OP NOTE
[facility]  Full Operative Progress Note     Surgery Date:  2020    Surgeon:  Carissa Carballo MD     Assistants:  Magda Lucero MD, PGY-3    Pre-op Diagnosis:    - Intrauterine pregnancy at 39w1d  - History of prior shoulder dystocia      Post-op Diagnosis:    - Same   - Liveborn male infant     Procedure:  Primary low-transverse  section with two layer uterine closure via Pfannenstiel incision    Anesthesia: Spinal     EBL:  564 mL    IVF:  900 mL crystalloid    UOP:  75 mL clear urine at the end of the case    Drains: Kendall Catheter     Specimens: Cord blood     Complications:  None apparent    Indications:   Leticia Ngo is a 30 year old  at 39w1d admitted for scheduled  section.  The risks, benefits, and alternatives of  section were discussed with the patient, and she agreed to proceed.     Findings:   1. No adhesions  2. Clear amniotic fluid  3. Liveborn male infant in cephalic presentation. Apgars 8 at 1 minute & 6 at 5 minutes. Weight pending.  4. Normal uterus, fallopian tubes, and ovaries.     Procedure Details:   The patient was brought to the OR, where adequate spinal anesthesia was administered.  She was placed in the dorsal supine position with a slight leftward tilt. She was prepped and draped in the usual sterile fashion. A surgical time out was performed. A pfannenstiel skin incision was made with the scalpel, and carried down to the underlying fascia with sharp and blunt dissection. The fascia was incised in the midline, and the incision was extended laterally with the Bettencourt scissors. The superior aspect of the fascia was grasped with the Kocher clamps and dissected off of the underlying rectus muscles with blunt and sharp dissection. Attention was then turned to the inferior aspect of the fascia, which was similarly dissected off of the underlying rectus muscles. The rectus muscles were  in the midline, and the  peritoneum was entered bluntly, and the opening was extended with digital pressure. The bladder blade was placed.  A transverse hysterotomy was made with the scalpel in the lower uterine segment, and the incision was extended with digital pressure. The infant was noted to be in the cephalic position, and was delivered atraumatically. The shoulders delivered easily. Nuchal cord x 1 was noted. The cord was doubly clamped and cut, and the infant was handed off to the awaiting nursery staff. A segment of cord was cut and held. The placenta was delivered with gentle traction on the umbilical cord and uterine massage. The uterus was cleared of all clots and debris. Uterine tone was noted to be firm with high dose of pitocin given through the running IV and uterine massage. The hysterotomy was closed with a running locked suture of 0 Vicryl.  The hysterotomy was then imbricated using an 0 Vicryl suture. The hysterotomy was noted to be hemostatic. The pericolic gutters were cleared of all clots and debris. The hysterotomy was reexamined and noted to be hemostatic. The fascia and rectus muscles were examined and areas of oozing were controlled with electrocautery. The fascia was closed with a running 0 Vicryl suture. The subcutaneous tissue was irrigated and areas of oozing were controlled with electrocautery. The subcutaneous tissue was less than 2 cm in thickness, and was therefore not closed. The skin was closed with 4-0 Monocryl and covered with a sterile dressing.    All sponge, needle, and instrument counts were correct. The patient tolerated the procedure well, and was transferred to recovery in stable condition. Dr. Carballo was present and scrubbed for the entirety of the procedure.     Magda Lucero MD  OB/GYN Resident, PGY-3  11/6/2020 9:44 AM      OBGYN Attending Addendum     I, Carissa Carballo, was scrubbed and present for the entire procedure. I have reviewed Dr. Lucero's operative report and  edited where necessary. I agree with the documentation of findings.     Carissa Carballo MD, MSCI  Date of Service: 11/06/20

## 2020-11-06 NOTE — ANESTHESIA PROCEDURE NOTES
Peripheral Nerve Block Procedure Note      Staff -   Anesthesiologist:  Todd Contreras MD  Resident/Fellow: Mick John MD  Performed By: resident  Procedure performed by resident/CRNA in presence of a teaching physician.    Location: OR AFTER induction  Procedure Start/Stop TImes:      11/6/2020 9:46 AM     11/6/2020 9:53 AM    patient identified, IV checked, site marked, risks and benefits discussed, informed consent, monitors and equipment checked, pre-op evaluation and post-op pain management      Correct Patient: Yes      Correct Position: Yes      Correct Site: Yes      Correct Procedure: Yes      Correct Laterality:  Yes    Site Marked:  Yes  Procedure details:     Procedure:  TAP and Quadratus lumborum    ASA:  2    Diagnosis:  S/P C section    Laterality:  Bilateral    Position:  Supine    Sterile Prep: chloraprep      Local skin infiltration:  None    Needle gauge:  21    Needle length (mm):  110    Catheter threaded easily: Yes      Ultrasound: Yes      Ultrasound used to identify targeted nerve, plexus, or vascular structure and placed a needle adjacent to it      Permanent Image entered into patiient's record      Abnormal pain on injection: No      Blood Aspirated: No      Paresthesias:  No    Bleeding at site: No      Bolus via:  Needle    Infusion Method:  Single Shot    Complications:  None

## 2020-11-06 NOTE — PROGRESS NOTES
Patient arrived to [facility] unit via zoom cart at 1245,with belongings, accompanied by , [place]. [place] in NICU. Received report from BARBARA Kim and checked bands. Unit and room orientation started. Call light given; no concerns present at this time. Continue with plan of care.

## 2020-11-07 LAB — HGB BLD-MCNC: 11.5 G/DL (ref 11.7–15.7)

## 2020-11-07 PROCEDURE — 120N000002 HC R&B MED SURG/OB UMMC

## 2020-11-07 PROCEDURE — 36415 COLL VENOUS BLD VENIPUNCTURE: CPT | Performed by: STUDENT IN AN ORGANIZED HEALTH CARE EDUCATION/TRAINING PROGRAM

## 2020-11-07 PROCEDURE — 250N000011 HC RX IP 250 OP 636: Performed by: STUDENT IN AN ORGANIZED HEALTH CARE EDUCATION/TRAINING PROGRAM

## 2020-11-07 PROCEDURE — 250N000013 HC RX MED GY IP 250 OP 250 PS 637: Performed by: OBSTETRICS & GYNECOLOGY

## 2020-11-07 PROCEDURE — 85018 HEMOGLOBIN: CPT | Performed by: STUDENT IN AN ORGANIZED HEALTH CARE EDUCATION/TRAINING PROGRAM

## 2020-11-07 PROCEDURE — 250N000013 HC RX MED GY IP 250 OP 250 PS 637: Performed by: STUDENT IN AN ORGANIZED HEALTH CARE EDUCATION/TRAINING PROGRAM

## 2020-11-07 RX ORDER — OXYCODONE HYDROCHLORIDE 5 MG/1
5 TABLET ORAL EVERY 6 HOURS PRN
Qty: 4 TABLET | Refills: 0 | Status: SHIPPED | OUTPATIENT
Start: 2020-11-07 | End: 2020-11-10

## 2020-11-07 RX ORDER — LIDOCAINE 4 G/G
1 PATCH TOPICAL
Status: DISCONTINUED | OUTPATIENT
Start: 2020-11-07 | End: 2020-11-09 | Stop reason: HOSPADM

## 2020-11-07 RX ADMIN — ACETAMINOPHEN 975 MG: 325 TABLET, FILM COATED ORAL at 01:26

## 2020-11-07 RX ADMIN — IBUPROFEN 800 MG: 800 TABLET ORAL at 22:54

## 2020-11-07 RX ADMIN — LIDOCAINE 1 PATCH: 560 PATCH PERCUTANEOUS; TOPICAL; TRANSDERMAL at 13:59

## 2020-11-07 RX ADMIN — IBUPROFEN 800 MG: 800 TABLET ORAL at 16:04

## 2020-11-07 RX ADMIN — KETOROLAC TROMETHAMINE 30 MG: 30 INJECTION, SOLUTION INTRAMUSCULAR at 04:44

## 2020-11-07 RX ADMIN — ACETAMINOPHEN 975 MG: 325 TABLET, FILM COATED ORAL at 09:27

## 2020-11-07 RX ADMIN — OXYCODONE HYDROCHLORIDE 5 MG: 5 TABLET ORAL at 20:52

## 2020-11-07 RX ADMIN — DOCUSATE SODIUM AND SENNOSIDES 1 TABLET: 8.6; 5 TABLET ORAL at 20:51

## 2020-11-07 RX ADMIN — OXYCODONE HYDROCHLORIDE 5 MG: 5 TABLET ORAL at 14:02

## 2020-11-07 RX ADMIN — SIMETHICONE 80 MG: 80 TABLET, CHEWABLE ORAL at 01:33

## 2020-11-07 RX ADMIN — IBUPROFEN 800 MG: 800 TABLET ORAL at 09:27

## 2020-11-07 RX ADMIN — ACETAMINOPHEN 975 MG: 325 TABLET, FILM COATED ORAL at 22:54

## 2020-11-07 RX ADMIN — ACETAMINOPHEN 975 MG: 325 TABLET, FILM COATED ORAL at 16:03

## 2020-11-07 RX ADMIN — DOCUSATE SODIUM AND SENNOSIDES 2 TABLET: 8.6; 5 TABLET ORAL at 09:27

## 2020-11-07 NOTE — PLAN OF CARE
Vitals & PP assessments are within normal limits. Lungs clear.   Denies pain after Toradol IV.    Up ad bang & visited baby in NICU.   Pt pumping & taking milk to baby.   Voided once post card removal.   Continue on supportive cares.

## 2020-11-07 NOTE — LACTATION NOTE
"This note was copied from a baby's chart.  D:  I met with Miki Miles and baby boy; this is their 2nd child.  She nursed her 1st for 3 months, then \"she wouldn't latch\" and she pumped another 12 months.  Romina was sleeping skin to skin with dad.  I:  We talked about challenges latching a baby when , normal sleepiness at this age vs \"second night\", and how finger feeding/ pumping/ hand expression can be a great bridge until they are reunited.  I reviewed hand expression technique and she did a great job.  I reviewed how to use Symphony pump.  She will call insurance on Monday about getting a new breast pump (has a very well used Spectra from her 1st child).  A:  Sleepy baby,  mother.  Hope to reunite soon.  P:  Will continue to provide lactation support.    Soniya Hernandez, RNC, IBCLC      "

## 2020-11-07 NOTE — PLAN OF CARE
VSS. Fundus midline, firm, and U/1. Scant lochia. Incisional dressing is C/D/I, no drainage noted. Pain adequately managed with scheduled and PRN pain meds, see e-MAR.    Ambulating independently, tolerating regular diet, and voiding without difficulty, encouraged frequent voiding. Pumping, Visiting and breastfeeding infant in NICU. Continue with plan of care.

## 2020-11-07 NOTE — PROGRESS NOTES
[facility] Obstetrics Post-Op / Progress Note         Assessment and Plan:    Assessment:   Post-operative day #1  Low transverse primary  section  L&D complications: IUP at 39+1  History of prior shoulder dystocia   NICU admission       Doing well.      Plan:   Ambulation encouraged  Breast feeding strategies discussed  Lidocaine patch added to regimen as she desires to avoid narcotic use           Interval History:   Doing well.  Pain is well-controlled.  No fevers.  No history of wound drainage, warmth or significant erythema.  Good appetite.  Denies chest pain, shortness of breath, nausea or vomiting.  Ambulatory.  Breastfeeding well.          Significant Problems:    None          Review of Systems:    The patient denies any chest pain, shortness of breath, excessive pain, fever, chills, purulent drainage from the wound, nausea or vomiting.          Medications:   All medications related to the patient's surgery have been reviewed          Physical Exam:   All vitals stable  Temp: 98.7  F (37.1  C) Temp src: Oral BP: 103/63 Pulse: 99   Resp: 18 SpO2: 98 %      My findings are: Appears well.   Abdomen: soft, NT, ND.   Incision Bandage clean and dry   LE without significant edema or erythema bilaterally            Data:     All laboratory data related to this surgery reviewed  Hemoglobin   Date Value Ref Range Status   2020 11.5 (L) 11.7 - 15.7 g/dL Final   2020 12.0 11.7 - 15.7 g/dL Final   2020 12.4 11.7 - 15.7 g/dL Final   2020 12.1 11.7 - 15.7 g/dL Final   2020 12.9 11.7 - 15.7 g/dL Final     No imaging studies have been ordered    Rachel Tovar MD

## 2020-11-07 NOTE — PLAN OF CARE
VSS. Pain managed on ibuprofen and tylenol, oxycodone x 1. Output WDL, stool x 1. Visiting baby in NICU for most of shift. Pumping and breastfeeding.  at bedside and attentive to pt. Continue to monitor.

## 2020-11-07 NOTE — PLAN OF CARE
Visiting infant in NICU, breastfeeding. Pt verbalized being incontinent of bladder while she was in NICU. Will continue to monitor.

## 2020-11-07 NOTE — TELEPHONE ENCOUNTER
"\"I am so confused. I just had someone call me to schedule a covid test prior to a C section, which is fine. I am almost 39 weeks, I was seen today (see epic) but no one even told me when the C section was scheduled for. I am not happy. They haven't even told me what I need to do to prep for a C section.\"  Per [facility] pt is scheduled for her C Section on 11/6 with Dr. Carballo.  Advised pt to call OB in am 11/4 to discuss.  Zoie High RN [facility]  COVID 19 Nurse Triage Plan/Patient Instructions    Please be aware that novel coronavirus (COVID-19) may be circulating in the community. If you develop symptoms such as fever, cough, or SOB or if you have concerns about the presence of another infection including coronavirus (COVID-19), please contact your health care provider or visit [email or web address].     Disposition/Instructions    Additional COVID19 information to add for patients.   How can I protect others?  If you have symptoms (fever, cough, body aches or trouble breathing): Stay home and away from others (self-isolate) until:    At least 10 days have passed since your symptoms started, And     You ve had no fever--and no medicine that reduces fever--for 1 full day (24 hours), And      Your other symptoms have resolved (gotten better).     If you don t have symptoms, but a test showed that you have COVID-19 (you tested positive):    Stay home and away from others (self-isolate). Follow the tips under \"How do I self-isolate?\" below for 10 days (20 days if you have a weak immune system).    You don't need to be retested for COVID-19 before going back to school or work. As long as you're fever-free and feeling better, you can go back to school, work and other activities after waiting the 10 or 20 days.     How do I self-isolate?    Stay in your own room, even for meals. Use your own bathroom if you can.     Stay away from others in your home. No hugging, kissing or shaking hands. No visitors.    Don t go " to work, school or anywhere else.     Clean  high touch  surfaces often (doorknobs, counters, handles, etc.). Use a household cleaning spray or wipes. You ll find a full list on the EPA website:  www.epa.gov/pesticide-registration/list-n-disinfectants-use-against-sars-cov-2.    Cover your mouth and nose with a mask, tissue or washcloth to avoid spreading germs.    Wash your hands and face often. Use soap and water.    Caregivers in these groups are at risk for severe illness due to COVID-19:  o People 65 years and older  o People who live in a nursing home or long-term care facility  o People with chronic disease (lung, heart, cancer, diabetes, kidney, liver, immunologic)  o People who have a weakened immune system, including those who:  - Are in cancer treatment  - Take medicine that weakens the immune system, such as corticosteroids  - Had a bone marrow or organ transplant  - Have an immune deficiency  - Have poorly controlled HIV or AIDS  - Are obese (body mass index of 40 or higher)  - Smoke regularly    Caregivers should wear gloves while washing dishes, handling laundry and cleaning bedrooms and bathrooms.    Use caution when washing and drying laundry: Don t shake dirty laundry, and use the warmest water setting that you can.    For more tips, go to www.cdc.gov/coronavirus/2019-ncov/downloads/10Things.pdf.    How can I take care of myself?  1. Get lots of rest. Drink extra fluids (unless a doctor has told you not to).     2. Take Tylenol (acetaminophen) for fever or pain. If you have liver or kidney problems, ask your family doctor if it s okay to take Tylenol.     Adults can take either:     650 mg (two 325 mg pills) every 4 to 6 hours, or     1,000 mg (two 500 mg pills) every 8 hours as needed.     Note: Don t take more than 3,000 mg in one day.   Acetaminophen is found in many medicines (both prescribed and over-the-counter medicines). Read all labels to be sure you don t take too much.     For children,  3 check the Tylenol bottle for the right dose. The dose is based on the child s age or weight.    3. If you have other health problems (like cancer, heart failure, an organ transplant or severe kidney disease): Call your specialty clinic if you don t feel better in the next 2 days.    4. Know when to call 911: Emergency warning signs include:    Trouble breathing or shortness of breath    Pain or pressure in the chest that doesn t go away    Feeling confused like you haven t felt before, or not being able to wake up    Bluish-colored lips or face    What are the symptoms of COVID-19?     The most common symptoms are cough, fever and trouble breathing.     Less common symptoms include body aches, chills, diarrhea (loose, watery poops), fatigue (feeling very tired), headache, runny nose, sore throat and loss of smell.    COVID-19 can cause severe coughing (bronchitis) and lung infection (pneumonia).    How does it spread?     The virus may spread when a person coughs or sneezes into the air. The virus can travel about 6 feet this way, and it can live on surfaces.      Common  (household disinfectants) will kill the virus.    Who is at risk?  Anyone can catch COVID-19 if they re around someone who has the virus.    How can others protect themselves?     Stay away from people who have COVID-19 (or symptoms of COVID-19).    Wash hands often with soap and water. Or, use hand  with at least 60% alcohol.    Avoid touching the eyes, nose or mouth.     Wear a face mask when you go out in public, when sick or when caring for a sick person.    Where can I get more information?     [facility]: About COVID-19: [email or web address]    CDC: What to Do If You re Sick: www.cdc.gov/coronavirus/2019-ncov/about/steps-when-sick.html    CDC: Ending Home Isolation: www.cdc.gov/coronavirus/2019-ncov/hcp/disposition-in-home-patients.html     CDC: Caring for Someone:  www.cdc.gov/coronavirus/2019-ncov/if-you-are-sick/care-for-someone.html     [facility]: Interim Guidance for Hospital Discharge to Home: www.[facility]/diseases/coronavirus/hcp/hospdischarge.pdf    [facility] clinical trials (COVID-19 research studies): clinicalaffairs.[facility]-clinical-trials     Below are the COVID-19 hotlines at the [place] [facility] ([facility]). Interpreters are available.   o For health questions: Call 038-922-8133 or 1-421.992.7610 (7 a.m. to 7 p.m.)  o For questions about schools and childcare: Call 080-233-9569 or 1-112.477.1266 (7 a.m. to 7 p.m.)          Thank you for taking steps to prevent the spread of this virus.  o Limit your contact with others.  o Wear a simple mask to cover your cough.  o Wash your hands well and often.    Resources    [facility]: About COVID-19: [email or web address]    CDC: What to Do If You're Sick: www.cdc.gov/coronavirus/2019-ncov/about/steps-when-sick.html    CDC: Ending Home Isolation: www.cdc.gov/coronavirus/2019-ncov/hcp/disposition-in-home-patients.html     CDC: Caring for Someone: www.cdc.gov/coronavirus/2019-ncov/if-you-are-sick/care-for-someone.html     [facility]: Interim Guidance for Hospital Discharge to Home: www.[facility]./diseases/coronavirus/hcp/hospdischarge.pdf    [facility] clinical trials (COVID-19 research studies): clinicalaffairs.[facility]-clinical-trials     Below are the COVID-19 hotlines at the [place] [facility] ([facility]). Interpreters are available.   o For health questions: Call 965-947-2102 or 1-345.705.5582 (7 a.m. to 7 p.m.)  o For questions about schools and childcare: Call 111-393-5874 or 1-370.958.1413 (7 a.m. to 7 p.m.)

## 2020-11-08 ENCOUNTER — MEDICAL CORRESPONDENCE (OUTPATIENT)
Dept: HEALTH INFORMATION MANAGEMENT | Facility: CLINIC | Age: 30
End: 2020-11-08

## 2020-11-08 PROCEDURE — 250N000013 HC RX MED GY IP 250 OP 250 PS 637: Performed by: STUDENT IN AN ORGANIZED HEALTH CARE EDUCATION/TRAINING PROGRAM

## 2020-11-08 PROCEDURE — 250N000013 HC RX MED GY IP 250 OP 250 PS 637: Performed by: OBSTETRICS & GYNECOLOGY

## 2020-11-08 PROCEDURE — 120N000002 HC R&B MED SURG/OB UMMC

## 2020-11-08 RX ADMIN — DOCUSATE SODIUM AND SENNOSIDES 2 TABLET: 8.6; 5 TABLET ORAL at 21:40

## 2020-11-08 RX ADMIN — SIMETHICONE 80 MG: 80 TABLET, CHEWABLE ORAL at 10:12

## 2020-11-08 RX ADMIN — IBUPROFEN 800 MG: 800 TABLET ORAL at 04:52

## 2020-11-08 RX ADMIN — LIDOCAINE 1 PATCH: 560 PATCH PERCUTANEOUS; TOPICAL; TRANSDERMAL at 21:39

## 2020-11-08 RX ADMIN — IBUPROFEN 800 MG: 800 TABLET ORAL at 10:57

## 2020-11-08 RX ADMIN — ACETAMINOPHEN 975 MG: 325 TABLET, FILM COATED ORAL at 10:56

## 2020-11-08 RX ADMIN — ACETAMINOPHEN 975 MG: 325 TABLET, FILM COATED ORAL at 04:53

## 2020-11-08 RX ADMIN — DOCUSATE SODIUM AND SENNOSIDES 2 TABLET: 8.6; 5 TABLET ORAL at 10:12

## 2020-11-08 RX ADMIN — ACETAMINOPHEN 975 MG: 325 TABLET, FILM COATED ORAL at 18:02

## 2020-11-08 RX ADMIN — IBUPROFEN 800 MG: 800 TABLET ORAL at 18:02

## 2020-11-08 NOTE — PLAN OF CARE
VSS, heart rate elevated after getting out of shower and back into bed. Pain managed with ibuprofen and tylenol. Positive bonding observed with .

## 2020-11-08 NOTE — PROGRESS NOTES
Post Partum Progress Note  PPD#2    Subjective:  She is resting comfortably in bed this morning. Pain is improving and well controlled on current medication regimen. She is tolerating PO intake. Lochia present and similar to a period.  She is voiding without difficulty. She has passed flatus and has had a BM. She is ambulating without dizziness or difficulty.  She denies headache, changes in vision, nausea/vomiting, chest pain, shortness of breath, RUQ pain, or worsening edema.  Plans to breast feed. Plans to discuss contraception at 6 week postpartum visit.    Objective:  Vitals:    20 0126 20 0624 20 0900 20 1600   BP: 102/58 90/48 103/63 105/62   Pulse: 82 89 99 91   Resp: 16 18 18 16   Temp: 97.5  F (36.4  C) 98  F (36.7  C) 98.7  F (37.1  C) 97.4  F (36.3  C)   TempSrc: Oral Oral Oral Oral   SpO2: 100% 98% 98%        General: NAD, resting comfortably  CV: Regular rate, well perfused.   Pulm: Normal respiratory effort.  Abd: Soft, non-tender, non-distended. Fundus is firm and 1 cm below the umbilicus.    Incision: incision is clean, dry, intact  Ext: trace lower extremity edema bilaterally. No calf tenderness.      Assessment/Plan:  Leticia Ngo is a 30 year old  female who is POD#2 s/p PLTCS for a history of shoulder dystocia. She would like to stay today to work on breastfeeding.     - Encourage routine post-operative goals including ambulation and incentive spirometry  - BP: elevated BP that were inaccurate per RN, did not meet criteria for any hypertensive disorders. If any further elevated Bps then would obtain HELLP labs  - PNC: Rh pos. Rubella immune. No intervention indicated.  - Pain: controlled on oral medications  - Heme: Hgb 12.0>>11.5.  - GI: continue anti-emetics and stool softeners as needed.  - : Voiding spontaneously .  - Infant: Stable in the room  - Feeding: Plans on breastfeeding.  - BC: Plans on discussing at her 6 week postpartum visit    Discharge  to home on POD#3    Chucky Cornejo MD  OB/GYN PGY-1  11/08/20 8:59 AM  I saw and evaluated the patient. I agree with the   findings and the plan of care as documented in the resident's note.  MD Nelly

## 2020-11-08 NOTE — PLAN OF CARE
VSS. Fundus midline, firm, and U/1. Scant lochia. Incision approximated with adhesive strips, C/D/I; no drainage noted. Pain adequately managed with heat pack, tylenol and ibuprofen. abd binder for comfort. Ambulating independently, tolerating regular diet, and voiding without difficulty. Breastfeeding with minimal assistance and pumping independently with encouragement. Bonding well with . Continue with plan of care.

## 2020-11-09 ENCOUNTER — LACTATION ENCOUNTER (OUTPATIENT)
Age: 30
End: 2020-11-09

## 2020-11-09 VITALS
SYSTOLIC BLOOD PRESSURE: 111 MMHG | RESPIRATION RATE: 16 BRPM | OXYGEN SATURATION: 98 % | HEART RATE: 79 BPM | DIASTOLIC BLOOD PRESSURE: 75 MMHG | TEMPERATURE: 97.9 F

## 2020-11-09 PROCEDURE — 250N000013 HC RX MED GY IP 250 OP 250 PS 637: Performed by: STUDENT IN AN ORGANIZED HEALTH CARE EDUCATION/TRAINING PROGRAM

## 2020-11-09 RX ADMIN — SIMETHICONE 80 MG: 80 TABLET, CHEWABLE ORAL at 08:05

## 2020-11-09 RX ADMIN — DOCUSATE SODIUM AND SENNOSIDES 2 TABLET: 8.6; 5 TABLET ORAL at 08:05

## 2020-11-09 RX ADMIN — ACETAMINOPHEN 975 MG: 325 TABLET, FILM COATED ORAL at 00:14

## 2020-11-09 RX ADMIN — OXYCODONE HYDROCHLORIDE 5 MG: 5 TABLET ORAL at 02:12

## 2020-11-09 RX ADMIN — ACETAMINOPHEN 975 MG: 325 TABLET, FILM COATED ORAL at 08:05

## 2020-11-09 RX ADMIN — IBUPROFEN 800 MG: 800 TABLET ORAL at 00:14

## 2020-11-09 RX ADMIN — IBUPROFEN 800 MG: 800 TABLET ORAL at 08:05

## 2020-11-09 NOTE — CONSULTS
[facility]  MATERNAL CHILD HEALTH   SOCIAL WORK PROGRESS NOTE    DATA:     SW was consulted due to NICU admission. Infant is now with mom in [facility] and preparing to discharge today.     INTERVENTION:     SW consulted with [facility] bedside RN, Rachel.     ASSESSMENT:     No indication for  psychosocial assessment. No SW concerns identified.    PLAN:     Anticipate no further SW involvement at this time. Re-consult for SW to follow if needs arise.    Kjerstin Rydeen, [facility]   Social Worker  Maternal Child Health   Direct: [phone]  Pager: [phone]

## 2020-11-09 NOTE — LACTATION NOTE
This note was copied from a baby's chart.  Consult for: Second baby, maternal request.    History:   delivery for history of significant shoulder dystocia with first baby. Delivered @ 39w1d, AGA infant @ 8# 4.3 oz. birthweight, 5.9% loss at 33 hours and -6.6% from birthweight at 54 hours of age with low risk level serum bilirubin.  Maternal history of fibrocystic breasts, ovarian cyst. Leticia  for 3 months with her first child then she refused to latch, she continued to pump for another 8 months or so with good milk supply.     Breast exam of mom: Soft, but filling with firmer areas milk coming in, symmetric breasts with intact but reddened, everted nipples bilaterally; small area lighter skin on right nipple just above shallow bifurcation, looks like possible blister or irritation at raised area on nipple.     Feeding assessment:  Baby had just fed before arrival but practiced simulated latch getting on deeper. Mom and RN share nursing going well, mom has some pain on left side with latch. Left ascom number to call for support with next feeding prn.     Education provided: Discussed positioning with good support, anatomy of breast and infant mouth, tips to get and maintain deeper latch, benefits of skin to skin and feeding on cue, supply and demand, benefits of frequent breast massage & hand expression in early days, hands on pumping briefly (1-2 minutes) only if needed - if feeling over full and baby doesn't feed both sides, etc. Talked about common nursing strike at different ages or stages, what may look like and different ways to work through it often lasts 2-4 days or more, resources she can refer to and get help from if occurs again. Reviewed baby's second night, how to tell when satiated and if getting enough, working through and preventing engorgement, breastfeeding log with when and who to call if concerns, [facility] pump cleaning handout and lactation resources for after discharge.  Plan:  Encouraged frequent skin to skin, breastfeed on cue with goal of 8 to 12 feedings in 24 hours & hand expressing prn to soften breast or if still cueing after feedings. Follow up with outpatient lactation consultant as needed for support with feedings (even later if nursing strike, etc.).

## 2020-11-09 NOTE — DISCHARGE INSTRUCTIONS
Discharge Instructions for  Section ()  You had a  section, or . During the , your baby was delivered through an incision in your stomach and uterus. Full recovery after a  can take time. It s important to take care of yourself -- for your own sake and because your new baby needs you. Here are some guidelines to follow at home.  Incision care  Here's how to take care of your incision:    Shower as needed. Pat your incision dry.    Watch your incision for signs of infection, like more redness or drainage.    Hold a pillow against the incision when you laugh or cough and when you get up from a lying or sitting position.    Remember, it can take as long as 6 weeks for your incision to heal.  Activity  Here are some suggestions:    Don t try to take care of anyone other than your baby and yourself.    Remember, the more active you are, the more likely you are to have an increase in your bleeding.    Get lots of rest. Take naps in the afternoon.    Increase your activities bit by bit.    Plan your activities so that you don t have to go up or down stairs more than needed.    Do postsurgical deep breathing and coughing exercises. Ask your healthcare provider for instructions.    Don t lift anything heavier than your baby until your healthcare provider tells you it s OK.    Don t drive until your healthcare provider says it s OK.    Don t have sexual intercourse until after you ve had a checkup with your healthcare provider and you have decided on a birth control method.    Allow others to do things for you. Don't hesitate to ask for help.  Follow-up  Make a follow-up appointment as directed by our staff.  When to call your healthcare provider  Call your healthcare provider right away if you have any of these:    Fever of 100.4 F (38 C) or higher    Redness, pain, or drainage at your incision site    Bleeding that requires a new sanitary pad every hour    Severe pain in  the abdomen    Pain or urgency with urination    Foul odor from vaginal discharge    Trouble urinating or emptying your bladder    No bowel movement within 1 week after the birth of your baby    Swollen, red, painful area in the leg    Appearance of rash or hives    Sore, red, painful area on the breasts that may come with flu-like symptoms    Feelings of anxiety, panic, and/or depression  Dayna last reviewed this educational content on 2/1/2018 2000-2020 [employer]. [address] [place] [address]. All rights reserved. This information is not intended as a substitute for professional medical care. Always follow your healthcare professional's instructions.

## 2020-11-09 NOTE — PLAN OF CARE
VSS. Pain managed with ibuprofen and tylenol. Reviewed discharge medications and instructions, patient verbalizes understanding.

## 2020-11-09 NOTE — PROGRESS NOTES
Post Partum Progress Note    Subjective:  Patient sleeping soundly in bed this morning.    Objective:  Vitals:    20 0029 20 1000 20 1600 20 0000   BP: 99/66 135/84 113/78 95/52   Pulse: 88 110 96 79   Resp: 18  16 16   Temp: 98.2  F (36.8  C) 97.8  F (36.6  C) 97.3  F (36.3  C) 97.9  F (36.6  C)   TempSrc: Oral Oral Oral Oral   SpO2:           General: NAD, resting comfortably  CV: Regular rate, well perfused.   Pulm: Normal respiratory effort.      Assessment/Plan:  Leticia Ngo is a 30 year old  female who is POD#3 s/p PLTCS for a history of shoulder dystocia. Patient recovering well, likely discharge today. As patient sleeping soundly this morning, will have staff OB complete physical exam prior to discharge.     - Encourage routine post-operative goals including ambulation and incentive spirometry  - BP: elevated BP that were inaccurate per RN, did not meet criteria for any hypertensive disorders. BP normal in the postpartum period.   - PNC: Rh pos. Rubella immune. No intervention indicated.  - Pain: controlled on oral medications  - Heme: Hgb 12.0>>11.5.  - GI: continue anti-emetics and stool softeners as needed.  - : Voiding spontaneously .  - Infant: Stable in the room  - Feeding: Plans on breastfeeding.  - BC: Plans on discussing at her 6 week postpartum visit    Discharge to home on POD#3    Magda Lucero MD  Obstetrics and Gynecology, PGY-3  2020 5:26 AM

## 2020-11-09 NOTE — PLAN OF CARE
Pt denies chest pain, headache, shortness of breath, nausea, lightheadedness or dizziness. VSS. Fundus midline, firm, and U/1. Scant lochia. Incision approximated with adhesive strips, clean, dry and intact, no drainage noted. Healing well. Pain adequately managed with tylenol, ibuprofen and oxycodone. Ambulating independently, tolerating regular diet, and voiding without difficulty. Breastfeeding independently. No new concerns reported. Bonding well with . Continue with plan of care.

## 2020-11-09 NOTE — PLAN OF CARE
Data: Vital signs within normal limits. Postpartum checks within normal limits - see flow record. Patient eating and drinking normally. Patient able to empty bladder independently and is up ambulating. No apparent signs of infection. Incision healing well. Patient performing self cares and is able to care for infant.  Action: Patient medicated during the shift for pain and cramping. See MAR. Patient reassessed within 1 hour after each medication and pain was improved - patient stated she was comfortable. Patient education done about pain control, incision care, self care when at home. See flow record.  Response: Positive attachment behaviors observed with infant. Support persons are present.   Plan: Anticipate discharge on Monday.

## 2020-11-16 ENCOUNTER — TELEPHONE (OUTPATIENT)
Dept: OBGYN | Facility: CLINIC | Age: 30
End: 2020-11-16

## 2020-11-16 ENCOUNTER — HEALTH MAINTENANCE LETTER (OUTPATIENT)
Age: 30
End: 2020-11-16

## 2020-11-16 DIAGNOSIS — N39.0 URINARY TRACT INFECTION: Primary | ICD-10-CM

## 2020-11-16 DIAGNOSIS — Z98.891 S/P CESAREAN SECTION: ICD-10-CM

## 2020-11-16 RX ORDER — OXYCODONE HYDROCHLORIDE 5 MG/1
5 TABLET ORAL EVERY 6 HOURS PRN
Qty: 6 TABLET | Refills: 0 | Status: SHIPPED | OUTPATIENT
Start: 2020-11-16 | End: 2020-11-22

## 2020-11-16 NOTE — TELEPHONE ENCOUNTER
Reviewed with Dr. Oropeza ok to wait until tomorrow for eval as noted below.  Appt tomorrow 11.17.2020 Dr. Olivier, pt is in agreement to plan.  If sx as noted below worsening or new urgent concerns needs to report or be seen immediately.  Voices agreement to plan, and agrees to appt

## 2020-11-16 NOTE — TELEPHONE ENCOUNTER
Discussed with Dr. Oropeza, agrees to plan as noted and will send pain meds now for Elvie Miles informed  Alternative to use water wipes as alternatives    Complete UA/UC as discussed    Pt is to call immediately or be seen with any increased discharge, fever, worsening pain, odor or new concerns, or redness or heat from incision    She voices concerns over the thick yellow discharge vaginal     Plan appt tomorrow 11/17/2020 at 1345 Dr. Olivier    Will discuss with MD as pt is quite concerned over discharge.  Sherron Bone RN

## 2020-11-16 NOTE — TELEPHONE ENCOUNTER
Spoke with Ginger    Pt doesn't always have a sensation if needs to void    Notes some dysuria however not like previous UTI she has had    Notes some odor with vaginal discharge and yellowish like discharge     Occasional back pain however thinks to holding baby    Irritation from pads , however she uses wipes on bottom - advised to use water in place of wipes    Doesn't see any warmth, redness or heat from incision without drainage    Temp 99.7 now when checked    Increased pain at night and she is requesting additional pain meds.      Will discuss with MD    UA/UC ordered and pt informed should do this to rule out uti    Also discussed increasing pain, or notes fever 100.4 or higher or increased discharge or odor needs immed eval    Voices agreement . Sherron Bone RN

## 2020-11-17 ENCOUNTER — OFFICE VISIT (OUTPATIENT)
Dept: OBGYN | Facility: CLINIC | Age: 30
End: 2020-11-17
Attending: OBSTETRICS & GYNECOLOGY
Payer: COMMERCIAL

## 2020-11-17 VITALS
HEIGHT: 65 IN | DIASTOLIC BLOOD PRESSURE: 72 MMHG | SYSTOLIC BLOOD PRESSURE: 118 MMHG | HEART RATE: 92 BPM | BODY MASS INDEX: 29.29 KG/M2

## 2020-11-17 DIAGNOSIS — R30.0 DYSURIA: Primary | ICD-10-CM

## 2020-11-17 PROCEDURE — 99212 OFFICE O/P EST SF 10 MIN: CPT | Mod: 24 | Performed by: OBSTETRICS & GYNECOLOGY

## 2020-11-17 PROCEDURE — G0463 HOSPITAL OUTPT CLINIC VISIT: HCPCS

## 2020-11-17 PROCEDURE — 87086 URINE CULTURE/COLONY COUNT: CPT | Performed by: STUDENT IN AN ORGANIZED HEALTH CARE EDUCATION/TRAINING PROGRAM

## 2020-11-17 NOTE — LETTER
"2020       RE: Leticia Ngo  [address]  [facility] [address]     Dear Colleague,    Thank you for referring your patient, Leticia Ngo, to the [facility] [place] [facility]. Please see a copy of my visit note below.    [facility]   Brief Progress Note    2020    CC: mucous-like vaginal discharge    S: Leticia Ngo is a 30 year old  presenting with mucous-like vaginal discharge- clear yellow with brown tinged and malodorous. She underwent scheduled PLTCS for history of shoulder dystocia on 2020. Reports small amount of vaginal bleeding until a few days ago since delivery.     Denies vaginal itching. Reports intermittent burning and irritation at the urethra since delivery. Feels she is able to empty bladder fully. Denies dysuria, urinary urgency, urinary frequency. Some difficulty emptying her bladder after card removal following delivery, but did not require straight cath for post-op urinary retention. Denies rashes/lesions. Has not has intercourse/anything in vagina since delivery. No fevers, chills. Voiding spontaneously and having bowel movements.     Reports abdominal pain at her incision and a couple inches superior to the incision on the left side. Pain is intermittent and improves with pain medications. Overall feels pain is increased from time of discharge, but has been much more active in caring for her toddler since discharge.  Last took oxycodone on . Has taken four oxycodone since delivery, typically at night/before bed. Was given refill for 6 additional tabs of oxycodone, but has not required thus far. Continues with scheduled acetaminophen and ibuprofen for pain control, which does help.    Breastfeeding well, ample milk supply. Infant doing well.     O:  /72   Pulse 92   Ht 1.651 m (5' 5\")   LMP 2020   BMI 29.29 kg/m     Temp 97.7 F    Gen: " NAD, alert  HEENT: AT/NC  Resp: non-labored breathing on room air  Abd: soft, non-distended, minimally tender, mild fundal and anterior uterine tenderness, appropriate uterine involution with fundus well below umbilicus   Inc: steri strips in place- recommended removal today, well healing, no discharge, no surrounding erythema or drainage   Pelvic: deferred  Psych: normal mood and affect     Labs: Urine culture ordered     Imaging: None indicated     A/P:  30 year old  POD#10 s/p scheduled PLTCS for history of shoulder dystocia presenting with vaginal discharge consistent with normal lochia alba in the postpartum period.     # Post-operative state  # Vaginal discharge   - Post-operative pain appears to be appropriate at this time in the patient's post-operative course. Recommended continuation of current pain regimen with ibuprofen, acetaminophen, and oxycodone as needed. Patient using opioids judiciously and is conscious of risks of addiction. Abdominal exam is overall benign. No concern for postpartum endometritis given time course, afebrile. Patient will call/return to clinic if she experiences worsening pain or develops fevers, chills, or other new/concerning/worsening symptoms.   - Discussed that vaginal discharge appears consistent with normal lochia alba in the postpartum period. No concern for infection at this time. Patient will continue to monitor.  - Continue pelvic rest, lifting restriction <10-15 lbs through 6 weeks post-op.   - Remove steri strips after shower now that they have been in place >7 days.     # Dysuria  - Urine culture sent. Symptoms overall do not appear consistent with UTI so will not treat empirically. Discussed limited utility of urinalysis in the post-partum period due to high likelihood of contamination.     RTC as needed, in 4 weeks for routine postpartum visit     Discussed with Dr. Roya Olivier MD  Ob/Gyn Resident, PGY-2  20 2:40 PM    The Patient was seen  in [facility] by Dr. Olivier.   I reviewed the history & exam. Assessment and plan were jointly made.   Snadra Pryor MD, MPH

## 2020-11-17 NOTE — PROGRESS NOTES
"[facility] Obstetrics and Gynecology Clinic   Brief Progress Note    2020    CC: mucous-like vaginal discharge    S: Leticia Ngo is a 30 year old  presenting with mucous-like vaginal discharge- clear yellow with brown tinged and malodorous. She underwent scheduled PLTCS for history of shoulder dystocia on 2020. Reports small amount of vaginal bleeding until a few days ago since delivery.     Denies vaginal itching. Reports intermittent burning and irritation at the urethra since delivery. Feels she is able to empty bladder fully. Denies dysuria, urinary urgency, urinary frequency. Some difficulty emptying her bladder after card removal following delivery, but did not require straight cath for post-op urinary retention. Denies rashes/lesions. Has not has intercourse/anything in vagina since delivery. No fevers, chills. Voiding spontaneously and having bowel movements.     Reports abdominal pain at her incision and a couple inches superior to the incision on the left side. Pain is intermittent and improves with pain medications. Overall feels pain is increased from time of discharge, but has been much more active in caring for her toddler since discharge.  Last took oxycodone on . Has taken four oxycodone since delivery, typically at night/before bed. Was given refill for 6 additional tabs of oxycodone, but has not required thus far. Continues with scheduled acetaminophen and ibuprofen for pain control, which does help.    Breastfeeding well, ample milk supply. Infant doing well.     O:  /72   Pulse 92   Ht 1.651 m (5' 5\")   LMP 2020   BMI 29.29 kg/m     Temp 97.7 F    Gen: NAD, alert  HEENT: AT/NC  Resp: non-labored breathing on room air  Abd: soft, non-distended, minimally tender, mild fundal and anterior uterine tenderness, appropriate uterine involution with fundus well below umbilicus   Inc: steri strips in place- recommended removal today, well healing, no discharge, no " surrounding erythema or drainage   Pelvic: deferred  Psych: normal mood and affect     Labs: Urine culture ordered     Imaging: None indicated     A/P:  30 year old  POD#10 s/p scheduled PLTCS for history of shoulder dystocia presenting with vaginal discharge consistent with normal lochia alba in the postpartum period.     # Post-operative state  # Vaginal discharge   - Post-operative pain appears to be appropriate at this time in the patient's post-operative course. Recommended continuation of current pain regimen with ibuprofen, acetaminophen, and oxycodone as needed. Patient using opioids judiciously and is conscious of risks of addiction. Abdominal exam is overall benign. No concern for postpartum endometritis given time course, afebrile. Patient will call/return to clinic if she experiences worsening pain or develops fevers, chills, or other new/concerning/worsening symptoms.   - Discussed that vaginal discharge appears consistent with normal lochia alba in the postpartum period. No concern for infection at this time. Patient will continue to monitor.  - Continue pelvic rest, lifting restriction <10-15 lbs through 6 weeks post-op.   - Remove steri strips after shower now that they have been in place >7 days.     # Dysuria  - Urine culture sent. Symptoms overall do not appear consistent with UTI so will not treat empirically. Discussed limited utility of urinalysis in the post-partum period due to high likelihood of contamination.     RTC as needed, in 4 weeks for routine postpartum visit     Discussed with Dr. Roya Olivier MD  Ob/Gyn Resident, PGY-2  20 2:40 PM    The Patient was seen in [facility] by Dr. Olivier.   I reviewed the history & exam. Assessment and plan were jointly made.   Sandra Pryor MD, MPH

## 2020-11-18 LAB
BACTERIA SPEC CULT: NORMAL
Lab: NORMAL
SPECIMEN SOURCE: NORMAL

## 2020-11-23 ENCOUNTER — TELEPHONE (OUTPATIENT)
Dept: OBGYN | Facility: CLINIC | Age: 30
End: 2020-11-23
Payer: COMMERCIAL

## 2020-11-23 DIAGNOSIS — O46.90 VAGINAL BLEEDING IN PREGNANCY: Primary | ICD-10-CM

## 2020-11-23 NOTE — TELEPHONE ENCOUNTER
Restarted bleeding today after stopping bleeding around .     Suddenly today felt a little bit weaker and started having bleeding now.    Yesterday, she walked for two blocks and went to the park, otherwise was doing a normal amount of work.     Has used just one pad, has not filled it. Though it is a large size.  - Has had more cramping that it has.   - was feeling pretty good yesterday.   - no fevers or chills  - no redness or drainage of c/s scar  - have not had difficult keeping food down.   - has had bad gas pain and loose stool.  - no antibiotics recently  - bleeding is consistent.     On the left of the belly button and below that, she is having some burning pain. - was told that it was pain from the stitch.     Discussed that I would recommend an US to evaluate the uterus and gave precautions on what to come in to the ER for.     Also advised that burning pain is likely related to nerve damage from dissection at time of  section. Would recommend continued monitoring and consider coming in for evaluation and possible one time lidocaine placement or systemic medication like gabapentin.    Quita Mac MD 2020 5:28 PM

## 2020-11-24 ENCOUNTER — TELEPHONE (OUTPATIENT)
Dept: OBGYN | Facility: CLINIC | Age: 30
End: 2020-11-24

## 2020-11-24 NOTE — TELEPHONE ENCOUNTER
Received call from staff at [facility] ED. Patient presented to ED at recommendation of [facility] RN for postpartum bleeding.    [facility] staff is requesting to speak with OB/Gyn provider at [facility] regarding patient.     [facility] staff would not disclose reason for phone call and could not discern what exactly was needing to be discussed.    RN indicated that more information would certainly help to triage for priority for provider return call but no further information provided.     Phone number to call [phone] and ask to speak with JESSE Fernandez. Advised will pass to on-call provider, Dr. Woodward.

## 2020-11-24 NOTE — TELEPHONE ENCOUNTER
----- Message from Fidelina Dick sent at 11/24/2020  9:40 AM CST -----  Regarding: bleeding  Hi,    Patient is calling stating she is experiencing some bleeding which she feels is abnormal and was advised to call and schedule an ultrasound. Please call patient at [phone].    Thanks,    Fidelina

## 2020-11-24 NOTE — TELEPHONE ENCOUNTER
"Patient called this morning and reported she is experiencing bleeding that she feels is abnormal.     Spoke with patient and she reported having a sudden onset of bright red vaginal bleeding yesterday afternoon accompanied with cramping. Patient states prior to yesterday, she had not had bleeding for 1 week. Patient states she called and spoke with the on-call provider and was told to call and schedule an US and to call and report if the bleeding gets worse. Patient states that the bleeding has not increased since yesterday, however, it hasn't decreased and reports changing her maxi pad hourly and it saturated, but not front to back/side to side. Patient states the bleeding is heavier than what is was during her first few days postpartum. She reports that it is bright red with occasional small clots. Patient denies dizziness, lightheadedness, shortness of breath, and racing heart rate, however, she does report feeling \"kind of funny, not able to think straight, and tired\".    Given these symptoms and the prolonged period of which she is reporting active bleeding, I instructed the patient to see care in the Emergency Department and she verbalized understanding and agreed with plan. Verbalized she would choose and ED closest to her with the least amount of wait. Instructed the patient to call back with any further questions/concerns.   "

## 2020-11-25 ENCOUNTER — OFFICE VISIT (OUTPATIENT)
Dept: OBGYN | Facility: CLINIC | Age: 30
End: 2020-11-25
Attending: OBSTETRICS & GYNECOLOGY
Payer: COMMERCIAL

## 2020-11-25 VITALS
WEIGHT: 158.6 LBS | DIASTOLIC BLOOD PRESSURE: 70 MMHG | SYSTOLIC BLOOD PRESSURE: 110 MMHG | HEART RATE: 84 BPM | BODY MASS INDEX: 26.39 KG/M2

## 2020-11-25 PROCEDURE — G0463 HOSPITAL OUTPT CLINIC VISIT: HCPCS

## 2020-11-25 PROCEDURE — 99212 OFFICE O/P EST SF 10 MIN: CPT | Mod: 24 | Performed by: OBSTETRICS & GYNECOLOGY

## 2020-11-25 ASSESSMENT — PATIENT HEALTH QUESTIONNAIRE - PHQ9
SUM OF ALL RESPONSES TO PHQ QUESTIONS 1-9: 2
5. POOR APPETITE OR OVEREATING: SEVERAL DAYS

## 2020-11-25 ASSESSMENT — ANXIETY QUESTIONNAIRES
GAD7 TOTAL SCORE: 2
1. FEELING NERVOUS, ANXIOUS, OR ON EDGE: NOT AT ALL
6. BECOMING EASILY ANNOYED OR IRRITABLE: NOT AT ALL
7. FEELING AFRAID AS IF SOMETHING AWFUL MIGHT HAPPEN: NOT AT ALL
2. NOT BEING ABLE TO STOP OR CONTROL WORRYING: SEVERAL DAYS
3. WORRYING TOO MUCH ABOUT DIFFERENT THINGS: NOT AT ALL
5. BEING SO RESTLESS THAT IT IS HARD TO SIT STILL: NOT AT ALL

## 2020-11-25 NOTE — PROGRESS NOTES
"[facility]  Gynecology Visit      HPI:    Leticia Ngo is a 30 year old , here for postpartum bleeding and incision check. Patient delivered at 39w1d via pLTCS on , elective for hx of shoulder dystocia. No complications apparent, per op note.    Patient reports she started bleeding two days ago. She reports this as \"heavy\" yesterday, states she used three pads within a four hour period. She did present to [facility] yesterday, where she had an US and was treated with cytotec. Her bleeding today is low, she has used one pad today. She denies dizziness, weakness, SOB. She denies fevers, abdominal tenderness, or purulent or foul smelling vaginal discharge. States she does have heavy periods normally.     US at [facility] ED revealed:   IMPRESSION:        1. Mildly enlarged uterus compatible with recent postpartum state.     2. Abnormally thickened and heterogeneous endometrium.     3. Avascular echogenic structures within the lower uterine segment and cervix which likely represent blood clots.     4. No definite evidence of retained products of conception.        GYN History  - LMP: Patient's last menstrual period was 2020.  - Contraception: Desires OCPs at 6 week visit     Lab Results   Component Value Date    PAP NIL 2020    PAP NIL 2015    PAP NIL 2010       OBHx  OB History    Para Term  AB Living   3 2 2 0 1 2   SAB TAB Ectopic Multiple Live Births   0 0 0 0 2      # Outcome Date GA Lbr Elder/2nd Weight Sex Delivery Anes PTL Lv   3 Term 20 39w1d  3.75 kg (8 lb 4.3 oz) M CS-LTranv   KATIE      Name: MANNIE NGO-LETICIA      Apgar1: 8  Apgar5: 6   2 AB 2019 7w0d    SAB      1 Term 19 38w4d  3.629 kg (8 lb) F  Nitrous N KATIE      Birth Comments: shoulder dystocia for about 110 sec, resolved with brad and suprapubic pressure       Complications: Shoulder Dystocia      Name: Francia       Current Outpatient Medications   Medication     acetaminophen " (TYLENOL) 325 MG tablet     ibuprofen (ADVIL/MOTRIN) 600 MG tablet     omega-3 acid ethyl esters (LOVAZA) 1 g capsule     Prenatal Vit-Fe Fumarate-FA (PRENATAL MULTIVITAMIN W/IRON) 27-0.8 MG tablet     No current facility-administered medications for this visit.      ROS: 10-Point ROS negative except as noted in HPI    Physical Exam  /70 (BP Location: Left arm, Patient Position: Chair)   Pulse 84   Wt 71.9 kg (158 lb 9.6 oz)   LMP 2020   BMI 26.39 kg/m    Gen: Well-appearing, NAD  HEENT: Normocephalic, atraumatic  CV:  Well perfused  Pulm: On room air, no increased work of breathing  Abd: Soft, non-tender, non-distended, incision well healed, nontender, uterus firm and involuted  Ext: No LE edema, extremities warm and well perfused    Assessment/Plan:  Leticia Ngo is a 30 year old  female here for postpartum bleeding.   - Bleeding with normal US and without symptoms of ABLA indicate a normal postpartum process.    - Counseled patient to present to ED if she begins bleeding heavily or becomes symptoms of acute blood loss anemia. Also counseled that if she begins saturating two pads within two consecutive hours to call clinic to be seen.     Return to clinic in four weeks for six-week postpartum visit.     Staffed with Dr. Oropeza.    Austyn Evans MD  Obstetrics, Gynecology, and Women's Health  PGY1  2020 , 9:39 PM      The Patient was seen in Resident Continuity Clinic by ASUTYN EVANS.  Patient was seen by me with the resident.     Norma Oropeza MD

## 2020-11-25 NOTE — LETTER
"2020       RE: Leticia Ngo  [address]  [place] [address]     Dear Colleague,    Thank you for referring your patient, Leticia Ngo, to the [facility] [place] at [facility]. Please see a copy of my visit note below.    [facility]  Gynecology Visit      HPI:    Leticia Ngo is a 30 year old , here for postpartum bleeding and incision check. Patient delivered at 39w1d via pLTCS on , elective for hx of shoulder dystocia. No complications apparent, per op note.    Patient reports she started bleeding two days ago. She reports this as \"heavy\" yesterday, states she used three pads within a four hour period. She did present to [facility] yesterday, where she had an US and was treated with cytotec. Her bleeding today is low, she has used one pad today. She denies dizziness, weakness, SOB. She denies fevers, abdominal tenderness, or purulent or foul smelling vaginal discharge. States she does have heavy periods normally.     US at [facility] ED revealed:   IMPRESSION:        1. Mildly enlarged uterus compatible with recent postpartum state.     2. Abnormally thickened and heterogeneous endometrium.     3. Avascular echogenic structures within the lower uterine segment and cervix which likely represent blood clots.     4. No definite evidence of retained products of conception.        GYN History  - LMP: Patient's last menstrual period was 2020.  - Contraception: Desires OCPs at 6 week visit     Lab Results   Component Value Date    PAP NIL 2020    PAP NIL 2015    PAP NIL 2010       OBHx  OB History    Para Term  AB Living   3 2 2 0 1 2   SAB TAB Ectopic Multiple Live Births   0 0 0 0 2      # Outcome Date GA Lbr Elder/2nd Weight Sex Delivery Anes PTL Lv   3 Term 20 39w1d  3.75 kg (8 lb 4.3 oz) M CS-LTranv   KATIE      Name: MANNIE NGO-LETICIA      Apgar1: 8  Apgar5: 6   2 AB 2019 " 7w0d    SAB      1 Term 19 38w4d  3.629 kg (8 lb) F  Nitrous N KATIE      Birth Comments: shoulder dystocia for about 110 sec, resolved with brad and suprapubic pressure       Complications: Shoulder Dystocia      Name: Francia       Current Outpatient Medications   Medication     acetaminophen (TYLENOL) 325 MG tablet     ibuprofen (ADVIL/MOTRIN) 600 MG tablet     omega-3 acid ethyl esters (LOVAZA) 1 g capsule     Prenatal Vit-Fe Fumarate-FA (PRENATAL MULTIVITAMIN W/IRON) 27-0.8 MG tablet     No current facility-administered medications for this visit.      ROS: 10-Point ROS negative except as noted in HPI    Physical Exam  /70 (BP Location: Left arm, Patient Position: Chair)   Pulse 84   Wt 71.9 kg (158 lb 9.6 oz)   LMP 2020   BMI 26.39 kg/m    Gen: Well-appearing, NAD  HEENT: Normocephalic, atraumatic  CV:  Well perfused  Pulm: On room air, no increased work of breathing  Abd: Soft, non-tender, non-distended, incision well healed, nontender, uterus firm and involuted  Ext: No LE edema, extremities warm and well perfused    Assessment/Plan:  Leticia Ngo is a 30 year old  female here for postpartum bleeding.   - Bleeding with normal US and without symptoms of ABLA indicate a normal postpartum process.    - Counseled patient to present to ED if she begins bleeding heavily or becomes symptoms of acute blood loss anemia. Also counseled that if she begins saturating two pads within two consecutive hours to call clinic to be seen.     Return to clinic in four weeks for six-week postpartum visit.     Staffed with Dr. Oropeza.    Austyn Evans MD  Obstetrics, Gynecology, and Women's Health  PGY1  2020 , 9:39 PM      The Patient was seen in Resident Continuity Clinic by AUSTYN EVANS.  I reviewed the history & exam. Assessment and plan were jointly made.    Norma Oropeza MD

## 2020-11-25 NOTE — NURSING NOTE
Chief Complaint   Patient presents with     Postpartum Care     2 wks pp care       See MARCI Vega 11/25/2020

## 2020-11-26 ASSESSMENT — ANXIETY QUESTIONNAIRES: GAD7 TOTAL SCORE: 2

## 2020-12-16 ENCOUNTER — OFFICE VISIT (OUTPATIENT)
Dept: OBGYN | Facility: CLINIC | Age: 30
End: 2020-12-16
Attending: OBSTETRICS & GYNECOLOGY
Payer: COMMERCIAL

## 2020-12-16 VITALS
HEIGHT: 65 IN | SYSTOLIC BLOOD PRESSURE: 117 MMHG | DIASTOLIC BLOOD PRESSURE: 62 MMHG | WEIGHT: 156.2 LBS | BODY MASS INDEX: 26.02 KG/M2 | HEART RATE: 94 BPM

## 2020-12-16 DIAGNOSIS — Z30.011 ENCOUNTER FOR INITIAL PRESCRIPTION OF CONTRACEPTIVE PILLS: ICD-10-CM

## 2020-12-16 DIAGNOSIS — Z30.018 ENCOUNTER FOR INITIAL PRESCRIPTION OF OTHER CONTRACEPTIVES: ICD-10-CM

## 2020-12-16 PROCEDURE — 99207 PR POST PARTUM EXAM: CPT | Performed by: OBSTETRICS & GYNECOLOGY

## 2020-12-16 RX ORDER — DIAPHRAGMS, CONTOURED 65 MM-80MM
1 DIAPHRAGM VAGINAL PRN
Qty: 1 EACH | Refills: 0 | Status: SHIPPED | OUTPATIENT
Start: 2020-12-16 | End: 2020-12-30

## 2020-12-16 RX ORDER — NORGESTIMATE AND ETHINYL ESTRADIOL 7DAYSX3 LO
1 KIT ORAL DAILY
Qty: 90 TABLET | Refills: 3 | Status: SHIPPED | OUTPATIENT
Start: 2020-12-16

## 2020-12-16 ASSESSMENT — MIFFLIN-ST. JEOR: SCORE: 1429.4

## 2020-12-16 NOTE — LETTER
2020       RE: Leticia Ngo  [address]  [place] [facility] [address]     Dear Colleague,    Thank you for referring your patient, Leticia Ngo, to the [facility] [place] at [facility]. Please see a copy of my visit note below.    6 Week Postpartum Visit Note    S:  Leticia Ngo is a 30 year old  here for her 6-week postpartum visit after elective, uncomplicated PLTCS at 39w1d (2020) in the setting of a history of shoulder dystocia.    The patient presents to clinic today in good health overall. Was seen 2020 at this clinic for ongoing vaginal bleeding for which she was subsequently evaluated at [facility] for. US performed at this time showed an abnormally thickened and heterogenous endometrium with blood clots in the lower uterine segment, but no definite evidence of retained products of conception. She was thus given Cytotec with prompt abatement of vaginal bleeding.     Since the above, the patient states she has stopped bleeding. No si/sx mastitis.     Wonders if the c/s was the right thing to do- risks and options reviewed again and patient reassured in her decision making. Discussed timing next delivery at least 18 months from this surgery.          Delivery History:      OB History    Para Term  AB Living   3 2 2 0 1 2   SAB TAB Ectopic Multiple Live Births   0 0 0 0 2      # Outcome Date GA Lbr Elder/2nd Weight Sex Delivery Anes PTL Lv   3 Term 20 39w1d  3.75 kg (8 lb 4.3 oz) M CS-LTranv   KATIE      Name: MANNIE NGO-LETICIA      Apgar1: 8  Apgar5: 6   2 AB 2019 7w0d    SAB      1 Term 19 38w4d  3.629 kg (8 lb) F  Nitrous N KATIE      Birth Comments: shoulder dystocia for about 110 sec, resolved with brad and suprapubic pressure       Complications: Shoulder Dystocia      Name: Francia         Postpartum History:   Feeding Method:  .  Complications reported with feeding:   "none.    Bleeding:  None.  Duration:  .  Menses resumed:  No  Bowel/Urinary problems:  No  Contraception Planned:  Diaphragm and maybe OCP  She  has not had intercourse since delivery..    Current tobacco use:  No  Hx of Abuse:  No  ================================================================  ROS: 10 point ROS neg other than the symptoms noted above in the HPI.     O:  EXAM:  /62   Pulse 94   Ht 1.651 m (5' 5\")   Wt 70.9 kg (156 lb 3.2 oz)   LMP 2020   BMI 25.99 kg/m    General: alert, oriented, sitting comfortably  Psych: normal mood and affect  Breasts: symmetric bilaterally, no edema or erythema, no skin retractions. No masses.  Abdomen: soft, non-tender, no masses appreciated.  Incision:  well healed   Pelvic Exam:  Vulva: No external lesions, normal hair distribution, no adenopathy  Vagina: Moist, pink, no abnormal discharge, well rugated, no lesions  Cervix: parous, smooth, pink, no visible lesions, closed  Uterus: Normal size, anteverted, non-tender, mobile  Ovaries: No mass, non-tender, mobile  LE without edema or erythema      Assessment/Plan:   Leticia Ngo is a 30 year old  who is now six weeks post-partum after PPD# s/p PLSTCS. Here for postpartum visit. Doing well at this time.     Routine Postpartum/Postoperative Care   - Patient now six weeks s/p elective PLCS. Appears to be doing well.   - Contraception: Desires OCPs at this visit - prescribed new diaphragm  - Feeding: breast    Rachel Tovar MD  "

## 2020-12-16 NOTE — PROGRESS NOTES
6 Week Postpartum Visit Note    S:  Leticia Ngo is a 30 year old  here for her 6-week postpartum visit after elective, uncomplicated PLTCS at 39w1d (2020) in the setting of a history of shoulder dystocia.    The patient presents to clinic today in good health overall. Was seen 2020 at this clinic for ongoing vaginal bleeding for which she was subsequently evaluated at [facility] for. US performed at this time showed an abnormally thickened and heterogenous endometrium with blood clots in the lower uterine segment, but no definite evidence of retained products of conception. She was thus given Cytotec with prompt abatement of vaginal bleeding.     Since the above, the patient states she has stopped bleeding. No si/sx mastitis.     Wonders if the c/s was the right thing to do- risks and options reviewed again and patient reassured in her decision making. Discussed timing next delivery at least 18 months from this surgery.          Delivery History:      OB History    Para Term  AB Living   3 2 2 0 1 2   SAB TAB Ectopic Multiple Live Births   0 0 0 0 2      # Outcome Date GA Lbr Elder/2nd Weight Sex Delivery Anes PTL Lv   3 Term 20 39w1d  3.75 kg (8 lb 4.3 oz) M CS-LTranv   KATIE      Name: MANNIE NGO-LETICIA      Apgar1: 8  Apgar5: 6   2 AB 2019 7w0d    SAB      1 Term 19 38w4d  3.629 kg (8 lb) F  Nitrous N KATIE      Birth Comments: shoulder dystocia for about 110 sec, resolved with brad and suprapubic pressure       Complications: Shoulder Dystocia      Name: Francia         Postpartum History:   Feeding Method:  .  Complications reported with feeding:  none.    Bleeding:  None.  Duration:  .  Menses resumed:  No  Bowel/Urinary problems:  No  Contraception Planned:  Diaphragm and maybe OCP  She  has not had intercourse since delivery..    Current tobacco use:  No  Hx of Abuse:  No  ================================================================  ROS: 10  "point ROS neg other than the symptoms noted above in the HPI.     O:  EXAM:  /62   Pulse 94   Ht 1.651 m (5' 5\")   Wt 70.9 kg (156 lb 3.2 oz)   LMP 2020   BMI 25.99 kg/m    General: alert, oriented, sitting comfortably  Psych: normal mood and affect  Breasts: symmetric bilaterally, no edema or erythema, no skin retractions. No masses.  Abdomen: soft, non-tender, no masses appreciated.  Incision:  well healed   Pelvic Exam:  Vulva: No external lesions, normal hair distribution, no adenopathy  Vagina: Moist, pink, no abnormal discharge, well rugated, no lesions  Cervix: parous, smooth, pink, no visible lesions, closed  Uterus: Normal size, anteverted, non-tender, mobile  Ovaries: No mass, non-tender, mobile  LE without edema or erythema      Assessment/Plan:   Leticia Ngo is a 30 year old  who is now six weeks post-partum after PPD# s/p PLSTCS. Here for postpartum visit. Doing well at this time.     Routine Postpartum/Postoperative Care   - Patient now six weeks s/p elective PLCS. Appears to be doing well.   - Contraception: Desires OCPs at this visit - prescribed new diaphragm  - Feeding: breast    Rachel Tovar MD                    "

## 2020-12-16 NOTE — NURSING NOTE
SUBJECTIVE:   Leticia Ngo is here for her 6-week postpartum checkup.     PHQ-9 score: 0  Hx of Abuse:  No    Delivery Date: 20.    Delivering provider:  Carissa Carballo.    Type of delivery:  .     Delivery complications: None  Infant gender:  boy, weight 8 pounds 4 oz.  Feeding Method:  .  Complications reported with feeding:  none, infant thriving .    Bleeding:  None.  Duration:  5 weeks.  Menses resumed:  No  Bowel/Urinary problems:  No    Contraception Planned:  Consult  She  has not had intercourse since delivery..

## 2020-12-18 PROBLEM — Z87.59 HISTORY OF SHOULDER DYSTOCIA IN PRIOR PREGNANCY: Status: RESOLVED | Noted: 2020-05-07 | Resolved: 2020-12-18

## 2020-12-18 PROBLEM — Z34.80 SUPERVISION OF OTHER NORMAL PREGNANCY: Status: RESOLVED | Noted: 2020-05-07 | Resolved: 2020-12-18

## 2020-12-18 PROBLEM — K21.9 ACID REFLUX: Status: RESOLVED | Noted: 2018-11-30 | Resolved: 2020-12-18

## 2020-12-18 PROBLEM — Z34.90 PREGNANCY WITH PRENATAL CARE ELSEWHERE: Status: RESOLVED | Noted: 2020-06-01 | Resolved: 2020-12-18

## 2020-12-18 PROBLEM — O36.60X0 LGA (LARGE FOR GESTATIONAL AGE) FETUS AFFECTING MANAGEMENT OF MOTHER: Status: RESOLVED | Noted: 2020-10-16 | Resolved: 2020-12-18

## 2020-12-18 PROBLEM — O09.293 HISTORY OF SHOULDER DYSTOCIA IN PRIOR PREGNANCY, CURRENTLY PREGNANT IN THIRD TRIMESTER: Status: RESOLVED | Noted: 2020-10-13 | Resolved: 2020-12-18

## 2020-12-29 ENCOUNTER — TELEPHONE (OUTPATIENT)
Dept: OBGYN | Facility: CLINIC | Age: 30
End: 2020-12-29

## 2020-12-29 NOTE — TELEPHONE ENCOUNTER
Left message on Ginger's voicemail that prescription was sent 12/16/20 to [facility] on [address].  Most pharmacies will order the diaphragm for patients, but if [facility] will not do so, the [facility] in our building will.  Advised to call back if she needs prescription sent to a different pharmacy.

## 2020-12-29 NOTE — TELEPHONE ENCOUNTER
----- Message from Marti Preston RN sent at 12/29/2020 10:00 AM CST -----  Regarding: Diaphram Refill  Refill request for Diaphram denied - pt wants to get it filled.  8 weeks PP

## 2020-12-30 ENCOUNTER — TELEPHONE (OUTPATIENT)
Dept: OBGYN | Facility: CLINIC | Age: 30
End: 2020-12-30

## 2020-12-30 DIAGNOSIS — Z30.018 ENCOUNTER FOR INITIAL PRESCRIPTION OF OTHER CONTRACEPTIVES: ICD-10-CM

## 2020-12-30 RX ORDER — DIAPHRAGMS, CONTOURED 65 MM-80MM
1 DIAPHRAGM VAGINAL PRN
Qty: 1 EACH | Refills: 0 | Status: SHIPPED | OUTPATIENT
Start: 2020-12-30

## 2020-12-30 RX ORDER — LACTIC ACID, L-, CITRIC ACID MONOHYDRATE, AND POTASSIUM BITARTRATE 90; 50; 20 MG/5G; MG/5G; MG/5G
1 GEL VAGINAL PRN
Qty: 3 BOX | Refills: 3 | Status: SHIPPED | OUTPATIENT
Start: 2020-12-30

## 2020-12-30 NOTE — TELEPHONE ENCOUNTER
Central Prior Authorization Team  Phone: [phone]    PA Initiation    Medication: Phexxi 1.8-0.4 % gel   Insurance Company: [employer] - Phone [phone] Fax [phone]  Pharmacy Filling the Rx: [place], [place] - [address]  Filling Pharmacy Phone: [phone]  Filling Pharmacy Fax:    Start Date: 12/30/2020

## 2020-12-30 NOTE — TELEPHONE ENCOUNTER
Prior Authorization Retail Medication Request    Medication/Dose: Phexxi 1.8-0.4 % gel   ICD code (if different than what is on RX):    Previously Tried and Failed:    Rationale:      Insurance Name:   Insurance ID:        Pharmacy Information (if different than what is on RX)  Name:    Phone:       Thank You,  Ana María Jimenes CPhT  [facility]

## 2020-12-30 NOTE — TELEPHONE ENCOUNTER
Prior Authorization Approval    Authorization Effective Date: 11/30/2020  Authorization Expiration Date: 12/30/2021  Medication: Phexxi 1.8-0.4 % gel- APPROVED   Approved Dose/Quantity:   Reference #:     Insurance Company: [employer] - Phone [phone] Fax [phone]  Expected CoPay:       CoPay Card Available:      Foundation Assistance Needed:    Which Pharmacy is filling the prescription (Not needed for infusion/clinic administered): [facility] [place], [place] - [address]  Pharmacy Notified: Yes  Patient Notified:  **Instructed pharmacy to notify patient when script is ready to /ship.**

## 2020-12-30 NOTE — TELEPHONE ENCOUNTER
Pt called regarding spotting and concerned.  This writer given the s/s of infection.  Pt verbalized understanding and reassured by information.

## 2020-12-30 NOTE — TELEPHONE ENCOUNTER
[facility] will not fill script so pt request to send to  pharmacy.  Pt requesting PHEXXI script also - routed to MD Tovar

## 2021-05-26 ENCOUNTER — RECORDS - HEALTHEAST (OUTPATIENT)
Dept: ADMINISTRATIVE | Facility: CLINIC | Age: 31
End: 2021-05-26

## 2021-05-28 ENCOUNTER — RECORDS - HEALTHEAST (OUTPATIENT)
Dept: ADMINISTRATIVE | Facility: CLINIC | Age: 31
End: 2021-05-28

## 2021-05-29 ENCOUNTER — RECORDS - HEALTHEAST (OUTPATIENT)
Dept: ADMINISTRATIVE | Facility: CLINIC | Age: 31
End: 2021-05-29

## 2021-09-18 ENCOUNTER — HEALTH MAINTENANCE LETTER (OUTPATIENT)
Age: 31
End: 2021-09-18

## 2022-01-08 ENCOUNTER — HEALTH MAINTENANCE LETTER (OUTPATIENT)
Age: 32
End: 2022-01-08

## 2022-06-19 ENCOUNTER — HEALTH MAINTENANCE LETTER (OUTPATIENT)
Age: 32
End: 2022-06-19

## 2022-11-19 ENCOUNTER — HEALTH MAINTENANCE LETTER (OUTPATIENT)
Age: 32
End: 2022-11-19

## 2023-03-10 ENCOUNTER — LAB REQUISITION (OUTPATIENT)
Dept: LAB | Facility: CLINIC | Age: 33
End: 2023-03-10
Payer: COMMERCIAL

## 2023-03-10 DIAGNOSIS — J32.0 CHRONIC MAXILLARY SINUSITIS: ICD-10-CM

## 2023-03-10 PROCEDURE — 87147 CULTURE TYPE IMMUNOLOGIC: CPT | Mod: ORL | Performed by: OTOLARYNGOLOGY

## 2023-03-10 PROCEDURE — 87077 CULTURE AEROBIC IDENTIFY: CPT | Mod: ORL | Performed by: OTOLARYNGOLOGY

## 2023-03-13 LAB — BACTERIA SPEC CULT: ABNORMAL

## 2023-04-28 ENCOUNTER — TELEPHONE (OUTPATIENT)
Dept: GASTROENTEROLOGY | Facility: CLINIC | Age: 33
End: 2023-04-28
Payer: COMMERCIAL

## 2023-04-28 NOTE — TELEPHONE ENCOUNTER
[facility] Call Center    Phone Message    May a detailed message be left on voicemail: yes     Reason for Call: Symptoms or Concerns     If patient has red-flag symptoms, warm transfer to triage line    Current symptom or concern: rectal bleeding and pain    Symptoms have been present for:  5 month(s) pain, 2 months rectal bleeding    Has patient previously been seen for this? No    By self:     Date: 04/28    Are there any new or worsening symptoms? No      Action Taken: Message routed to:  [facility] gi    Travel Screening: Not Applicable

## 2023-06-02 ENCOUNTER — LAB REQUISITION (OUTPATIENT)
Dept: LAB | Facility: CLINIC | Age: 33
End: 2023-06-02
Payer: COMMERCIAL

## 2023-06-02 PROCEDURE — 87077 CULTURE AEROBIC IDENTIFY: CPT | Mod: ORL | Performed by: OTOLARYNGOLOGY

## 2023-06-07 LAB
BACTERIA SPEC CULT: ABNORMAL
BACTERIA SPEC CULT: ABNORMAL

## 2023-11-10 ENCOUNTER — LAB REQUISITION (OUTPATIENT)
Dept: LAB | Facility: CLINIC | Age: 33
End: 2023-11-10
Payer: COMMERCIAL

## 2023-11-10 DIAGNOSIS — J32.0 CHRONIC MAXILLARY SINUSITIS: ICD-10-CM

## 2023-11-10 PROCEDURE — 87077 CULTURE AEROBIC IDENTIFY: CPT | Mod: ORL | Performed by: OTOLARYNGOLOGY

## 2023-11-14 LAB — BACTERIA SPEC CULT: ABNORMAL

## 2023-11-19 ENCOUNTER — HEALTH MAINTENANCE LETTER (OUTPATIENT)
Age: 33
End: 2023-11-19

## 2023-12-29 NOTE — LETTER
"10/2/2020       RE: Leticia Ngo  [address]  [place] [address]     Dear Colleague,    Thank you for referring your patient, Leticia Ngo, to the [facility] [place] at [facility]. Please see a copy of my visit note below.    Subjective:      30 year old  at 34w1d presentst for a routine prenatal appointment.    Denies vaginal bleeding or leakage of fluid.  Reports elisa garay, denies painful or regular contractions. + fetal movement.       No HA, visual changes, RUQ or epigastric pain.     Patient concerns:   - fatigue: reports she is not getting adequate sleep, some nights 4-5 hours.  Wakes up frequently at night due to discomfort and need to urinate.  18 mo daughter has also been waking up frequently at night.  - has been doing hypnobirthing.  - has questions and concerns regarding shoulder dystocia.  Had a shoulder dystocia with first child that lasted 110 seconds.  Baby was 8 lbs, has no effects from dystocia.  Is inquiring about the likelihood of a shoulder dystocia with this delivery.    TDAP given 20   Flu given 20    Objective:  Vitals:    10/02/20 1050   BP: 107/70   BP Location: Right arm   Pulse: 91   Weight: 78.2 kg (172 lb 4.8 oz)   Height: 1.651 m (5' 5\")   See ob flowsheet  Assessment/Plan     Encounter Diagnoses   Name Primary?     Pregnancy with prenatal care elsewhere in third trimester Yes     History of shoulder dystocia in prior pregnancy      Orders Placed This Encounter   Procedures     US OB >14 Weeks Follow Up     No orders of the defined types were placed in this encounter.    ABO   Date Value Ref Range Status   2020 A  Final     RH(D)   Date Value Ref Range Status   2020 Pos  Final     Antibody Screen   Date Value Ref Range Status   2020 negative  Final   - Rhogam:  RH positive, was not given.    - Discussed fatigue, encouraged frequent naps and rest when possible.    - " Alert-The patient is alert, awake and responds to voice. The patient is oriented to time, place, and person. The triage nurse is able to obtain subjective information. Discussed shoulder dystocia, that while they are unpredictable, we can anticipate and prepare for the potential.    - Reviewed total weight gain, encouraged continued healthy diet and exercise.  .  Reviewed importance of daily fetal kick count and why/how to contact provider.    - Reviewed why/how to contact provider if headache/visual changes/RUQ or epigastric pain, decreased fetal movement, vaginal bleeding, leakage of fluid or more than 4 contractions in an hour.    - Growth ultrasound at 36 weeks for hx of shoulder dystocia.    Patient education/orders or handouts today:  PTL signs/symptoms  Reviewed GBS screening at 35-36 wks.    Return to clinic in 2 weeks for ANNA and growth ultrasound, and prn if questions or concerns.     I, Sandra Palomares, am serving as a scribe; to document services personally performed by  EVETTE Umana, SATNAM based on data collection and the provider's statements to me.     Sandra Palomares, RN, SNM    I agree with the PFSH and ROS as completed by the student, except for changes made by me. The remainder of the encounter was performed by me and scribed by the student. The scribed note accurately reflects my personal services and decisions made by me.  Allie Borrego DNP, SATNAM, EVETTE

## 2024-10-02 ENCOUNTER — LAB REQUISITION (OUTPATIENT)
Dept: LAB | Facility: CLINIC | Age: 34
End: 2024-10-02
Payer: COMMERCIAL

## 2024-10-02 DIAGNOSIS — J32.0 CHRONIC MAXILLARY SINUSITIS: ICD-10-CM

## 2024-10-02 PROCEDURE — 87077 CULTURE AEROBIC IDENTIFY: CPT | Mod: ORL | Performed by: OTOLARYNGOLOGY

## 2024-10-02 PROCEDURE — 87070 CULTURE OTHR SPECIMN AEROBIC: CPT | Mod: ORL | Performed by: OTOLARYNGOLOGY

## 2024-10-04 LAB — BACTERIA SPEC CULT: ABNORMAL

## 2025-01-31 ENCOUNTER — LAB REQUISITION (OUTPATIENT)
Dept: LAB | Facility: CLINIC | Age: 35
End: 2025-01-31
Payer: COMMERCIAL

## 2025-01-31 DIAGNOSIS — J32.0 CHRONIC MAXILLARY SINUSITIS: ICD-10-CM

## 2025-01-31 PROCEDURE — 87077 CULTURE AEROBIC IDENTIFY: CPT | Mod: ORL | Performed by: OTOLARYNGOLOGY

## 2025-02-03 LAB — BACTERIA SPEC CULT: ABNORMAL

## (undated) DEVICE — SUCTION CANISTER MEDIVAC LINER 1500ML W/LID 65651-515

## (undated) DEVICE — CATH TRAY FOLEY SURESTEP 16FR WDRAIN BAG STLK LATEX A300316A

## (undated) DEVICE — PACK C-SECTION LF PL15OTA83B

## (undated) DEVICE — SU VICRYL 0 CT-1 36" J346H

## (undated) DEVICE — PREP CHLORAPREP 26ML TINTED ORANGE  260815

## (undated) DEVICE — GLOVE PROTEXIS BLUE W/NEU-THERA 7.0  2D73EB70

## (undated) DEVICE — STRAP KNEE/BODY 31143004

## (undated) DEVICE — GLOVE ESTEEM POWDER FREE SMT 6.5  2D72PT65

## (undated) DEVICE — STOCKING SLEEVE COMPRESSION CALF LG

## (undated) DEVICE — BASIN SET MAJOR

## (undated) DEVICE — SU MONOCRYL 4-0 PS-2 18" UND Y496G

## (undated) DEVICE — SOL WATER IRRIG 1000ML BOTTLE 07139-09

## (undated) DEVICE — SU PLAIN 3-0 CTX 27" 873H

## (undated) DEVICE — SOL NACL 0.9% IRRIG 1000ML BOTTLE 07138-09

## (undated) RX ORDER — OXYTOCIN/0.9 % SODIUM CHLORIDE 30/500 ML
PLASTIC BAG, INJECTION (ML) INTRAVENOUS
Status: DISPENSED
Start: 2020-11-06

## (undated) RX ORDER — KETOROLAC TROMETHAMINE 30 MG/ML
INJECTION, SOLUTION INTRAMUSCULAR; INTRAVENOUS
Status: DISPENSED
Start: 2020-11-06

## (undated) RX ORDER — MORPHINE SULFATE 1 MG/ML
INJECTION, SOLUTION EPIDURAL; INTRATHECAL; INTRAVENOUS
Status: DISPENSED
Start: 2020-11-06

## (undated) RX ORDER — FENTANYL CITRATE-0.9 % NACL/PF 10 MCG/ML
PLASTIC BAG, INJECTION (ML) INTRAVENOUS
Status: DISPENSED
Start: 2020-11-06

## (undated) RX ORDER — FENTANYL CITRATE 50 UG/ML
INJECTION, SOLUTION INTRAMUSCULAR; INTRAVENOUS
Status: DISPENSED
Start: 2020-11-06